# Patient Record
Sex: FEMALE | Employment: STUDENT | URBAN - METROPOLITAN AREA
[De-identification: names, ages, dates, MRNs, and addresses within clinical notes are randomized per-mention and may not be internally consistent; named-entity substitution may affect disease eponyms.]

---

## 2022-05-26 ENCOUNTER — HOSPITAL ENCOUNTER (OUTPATIENT)
Age: 22
Setting detail: SPECIMEN
Discharge: HOME | End: 2022-05-26
Payer: COMMERCIAL

## 2022-05-26 ENCOUNTER — OFFICE VISIT (OUTPATIENT)
Dept: OBGYN CLINIC | Age: 22
End: 2022-05-26
Payer: COMMERCIAL

## 2022-05-26 VITALS
HEART RATE: 86 BPM | DIASTOLIC BLOOD PRESSURE: 72 MMHG | HEIGHT: 70 IN | BODY MASS INDEX: 27.2 KG/M2 | WEIGHT: 190 LBS | SYSTOLIC BLOOD PRESSURE: 112 MMHG

## 2022-05-26 DIAGNOSIS — Z76.89 ENCOUNTER TO ESTABLISH CARE WITH NEW DOCTOR: ICD-10-CM

## 2022-05-26 DIAGNOSIS — N76.0 VAGINITIS AND VULVOVAGINITIS: Primary | ICD-10-CM

## 2022-05-26 DIAGNOSIS — N76.0 VAGINITIS AND VULVOVAGINITIS: ICD-10-CM

## 2022-05-26 PROCEDURE — 87491 CHLMYD TRACH DNA AMP PROBE: CPT

## 2022-05-26 PROCEDURE — 87591 N.GONORRHOEAE DNA AMP PROB: CPT

## 2022-05-26 PROCEDURE — 87808 TRICHOMONAS ASSAY W/OPTIC: CPT

## 2022-05-26 PROCEDURE — 99213 OFFICE O/P EST LOW 20 MIN: CPT | Performed by: OBSTETRICS & GYNECOLOGY

## 2022-05-26 PROCEDURE — 87210 SMEAR WET MOUNT SALINE/INK: CPT

## 2022-05-26 RX ORDER — NORETHINDRONE ACETATE AND ETHINYL ESTRADIOL AND FERROUS FUMARATE 1MG-20(21)
KIT ORAL
COMMUNITY
Start: 2022-04-23 | End: 2022-07-23

## 2022-05-26 NOTE — PROGRESS NOTES
Subjective:      Patient ID:  Azucena Nunez is a 23 y.o. female with chief complaint of:  Chief Complaint   Patient presents with    New Patient     new pt has swelling in her labial area after having sexual intercourse 2 days ago        Patient presents with concern she was sexually active a few days ago and noted a swelling in her left labia. She says this area seems to be going down or reducing in size but it still present and she presents today for evaluation      History reviewed. No pertinent past medical history. History reviewed. No pertinent surgical history. History reviewed. No pertinent family history. Current Outpatient Medications on File Prior to Visit   Medication Sig Dispense Refill    JUNEL FE 1/20 1-20 MG-MCG per tablet TAKE 1 TABLET BY MOUTH EVERY DAY       No current facility-administered medications on file prior to visit. Allergies:  Patient has no known allergies. Review of Systems   Constitutional: Negative for fatigue and fever. Respiratory: Negative for apnea and shortness of breath. Cardiovascular: Negative for chest pain and palpitations. Gastrointestinal: Negative for abdominal pain. Genitourinary: Negative for difficulty urinating, dyspareunia, dysuria, pelvic pain, vaginal bleeding and vaginal discharge. Neurological: Negative for dizziness, weakness and light-headedness. Psychiatric/Behavioral: Negative for agitation and dysphoric mood. Objective:   /72   Pulse 86   Ht 5' 10\" (1.778 m)   Wt 190 lb (86.2 kg)   LMP 05/16/2022   BMI 27.26 kg/m²      Physical Exam  Constitutional:       Appearance: She is well-developed. Eyes:      Pupils: Pupils are equal, round, and reactive to light. Cardiovascular:      Rate and Rhythm: Normal rate and regular rhythm. Heart sounds: Normal heart sounds. Pulmonary:      Effort: Pulmonary effort is normal.   Abdominal:      General: Bowel sounds are normal.      Palpations: Abdomen is soft. Genitourinary:     Labia:         Right: No rash, tenderness or lesion. Left: Tenderness present. No rash or lesion. Comments: Compressible fluid filled area inora the right labia minora. Non indurated  In nature  Neurological:      Mental Status: She is alert and oriented to person, place, and time. Assessment:       Diagnosis Orders   1. Vaginitis and vulvovaginitis  C.trachomatis N.gonorrhoeae DNA    Wet prep, genital   2. Encounter to establish care with new doctor  Marguerite Braga RN, MSN, CNP, Primary Care, [facility]         Plan:      Orders Placed This Encounter   Procedures    C.trachomatis N.gonorrhoeae DNA     Standing Status:   Future     Number of Occurrences:   1     Standing Expiration Date:   5/26/2023    Wet prep, genital     Standing Status:   Future     Number of Occurrences:   1     Standing Expiration Date:   5/26/2023   [address] [facility] Heidi Rivas RN, MSN, CNP, Primary Care, [facility]     Referral Priority:   Routine     Referral Type:   Eval and Treat     Referral Reason:   Specialty Services Required     Requested Specialty:   Nurse Practitioner     Number of Visits Requested:   1     No orders of the defined types were placed in this encounter. No follow-ups on file.      Juni Bartholomew DO

## 2022-05-27 LAB
CLUE CELLS: NORMAL
TRICHOMONAS PREP: NORMAL
TRICHOMONAS VAGINALIS SCREEN: NEGATIVE
YEAST WET PREP: NORMAL

## 2022-05-30 ASSESSMENT — ENCOUNTER SYMPTOMS
APNEA: 0
SHORTNESS OF BREATH: 0
ABDOMINAL PAIN: 0

## 2022-06-02 LAB
C TRACH DNA GENITAL QL NAA+PROBE: NEGATIVE
N. GONORRHOEAE DNA: NEGATIVE

## 2022-06-26 ENCOUNTER — HOSPITAL ENCOUNTER (EMERGENCY)
Age: 22
Discharge: HOME | End: 2022-06-26
Attending: EMERGENCY MEDICINE
Payer: COMMERCIAL

## 2022-06-26 VITALS
BODY MASS INDEX: 27.2 KG/M2 | RESPIRATION RATE: 18 BRPM | TEMPERATURE: 97.5 F | SYSTOLIC BLOOD PRESSURE: 139 MMHG | DIASTOLIC BLOOD PRESSURE: 89 MMHG | HEART RATE: 98 BPM | OXYGEN SATURATION: 98 % | WEIGHT: 190 LBS | HEIGHT: 70 IN

## 2022-06-26 DIAGNOSIS — N75.0 BARTHOLIN CYST: Primary | ICD-10-CM

## 2022-06-26 LAB
BACTERIA: ABNORMAL /HPF
BILIRUBIN URINE: NEGATIVE
BLOOD, URINE: ABNORMAL
CLARITY: CLEAR
COLOR: YELLOW
EPITHELIAL CELLS, UA: ABNORMAL /HPF
GLUCOSE URINE: NEGATIVE MG/DL
HCG(URINE) PREGNANCY TEST: NEGATIVE
KETONES, URINE: NEGATIVE MG/DL
LEUKOCYTE ESTERASE, URINE: NEGATIVE
NITRITE, URINE: NEGATIVE
PH UA: 7 (ref 5–9)
PROTEIN UA: NEGATIVE MG/DL
RBC UA: ABNORMAL /HPF (ref 0–2)
SPECIFIC GRAVITY UA: 1.02 (ref 1–1.03)
URINE REFLEX TO CULTURE: ABNORMAL
UROBILINOGEN, URINE: 0.2 E.U./DL
WBC UA: ABNORMAL /HPF (ref 0–5)

## 2022-06-26 PROCEDURE — 84703 CHORIONIC GONADOTROPIN ASSAY: CPT

## 2022-06-26 PROCEDURE — 99283 EMERGENCY DEPT VISIT LOW MDM: CPT

## 2022-06-26 PROCEDURE — 87491 CHLMYD TRACH DNA AMP PROBE: CPT

## 2022-06-26 PROCEDURE — 81001 URINALYSIS AUTO W/SCOPE: CPT

## 2022-06-26 PROCEDURE — 87591 N.GONORRHOEAE DNA AMP PROB: CPT

## 2022-06-26 RX ORDER — KETOROLAC TROMETHAMINE 10 MG/1
10 TABLET, FILM COATED ORAL EVERY 6 HOURS PRN
Qty: 20 TABLET | Refills: 0 | Status: SHIPPED | OUTPATIENT
Start: 2022-06-26 | End: 2022-07-18

## 2022-06-26 RX ORDER — DOXYCYCLINE HYCLATE 100 MG
100 TABLET ORAL 2 TIMES DAILY
Qty: 20 TABLET | Refills: 0 | Status: SHIPPED | OUTPATIENT
Start: 2022-06-26 | End: 2022-07-06

## 2022-06-26 ASSESSMENT — PAIN SCALES - GENERAL
PAINLEVEL_OUTOF10: 5
PAINLEVEL_OUTOF10: 5

## 2022-06-26 ASSESSMENT — PAIN DESCRIPTION - ONSET
ONSET: ON-GOING
ONSET: ON-GOING

## 2022-06-26 ASSESSMENT — PAIN DESCRIPTION - LOCATION
LOCATION: VAGINA
LOCATION: VAGINA

## 2022-06-26 ASSESSMENT — ENCOUNTER SYMPTOMS
ABDOMINAL PAIN: 0
CONSTIPATION: 0
WHEEZING: 0
VOMITING: 0
RHINORRHEA: 0
COUGH: 0
DIARRHEA: 0
SORE THROAT: 0
PHOTOPHOBIA: 0
ABDOMINAL DISTENTION: 0
APNEA: 0
COLOR CHANGE: 0
SHORTNESS OF BREATH: 0
NAUSEA: 0
BACK PAIN: 0
SINUS PRESSURE: 0
EYE PAIN: 0

## 2022-06-26 ASSESSMENT — PAIN DESCRIPTION - FREQUENCY
FREQUENCY: CONTINUOUS
FREQUENCY: CONTINUOUS

## 2022-06-26 ASSESSMENT — PAIN DESCRIPTION - DESCRIPTORS
DESCRIPTORS: BURNING
DESCRIPTORS: DISCOMFORT

## 2022-06-26 ASSESSMENT — PAIN DESCRIPTION - ORIENTATION: ORIENTATION: INNER

## 2022-06-26 ASSESSMENT — PAIN - FUNCTIONAL ASSESSMENT
PAIN_FUNCTIONAL_ASSESSMENT: 0-10
PAIN_FUNCTIONAL_ASSESSMENT: 0-10

## 2022-06-26 ASSESSMENT — PAIN DESCRIPTION - PAIN TYPE
TYPE: ACUTE PAIN
TYPE: ACUTE PAIN

## 2022-06-30 LAB
C. TRACHOMATIS DNA ,URINE: NEGATIVE
N. GONORRHOEAE DNA, URINE: NEGATIVE

## 2022-07-08 ENCOUNTER — OFFICE VISIT (OUTPATIENT)
Dept: OBGYN CLINIC | Age: 22
End: 2022-07-08
Payer: COMMERCIAL

## 2022-07-08 VITALS
BODY MASS INDEX: 27.63 KG/M2 | HEIGHT: 70 IN | SYSTOLIC BLOOD PRESSURE: 118 MMHG | WEIGHT: 193 LBS | DIASTOLIC BLOOD PRESSURE: 72 MMHG

## 2022-07-08 DIAGNOSIS — N90.7 LABIAL CYST: Primary | ICD-10-CM

## 2022-07-08 PROCEDURE — 99214 OFFICE O/P EST MOD 30 MIN: CPT | Performed by: OBSTETRICS & GYNECOLOGY

## 2022-07-08 ASSESSMENT — ENCOUNTER SYMPTOMS
APNEA: 0
ABDOMINAL PAIN: 0
SHORTNESS OF BREATH: 0

## 2022-07-09 NOTE — PROGRESS NOTES
Subjective:      Patient ID:  Neo Joy is a 23 y.o. female with chief complaint of:  Chief Complaint   Patient presents with    Follow-up     vaginal lump is getting larger and more bothersome       Patient presents today as follow-up she was originally seen because of a left labial cyst that was not discomforting but was enlarging. Etiology of the cyst is unknown. Patient desires to have this removed      No past medical history on file. No past surgical history on file. No family history on file. Current Outpatient Medications on File Prior to Visit   Medication Sig Dispense Refill    JUNEL FE 1/20 1-20 MG-MCG per tablet TAKE 1 TABLET BY MOUTH EVERY DAY      ketorolac (TORADOL) 10 MG tablet Take 1 tablet by mouth every 6 hours as needed for Pain (Patient not taking: Reported on 7/8/2022) 20 tablet 0     No current facility-administered medications on file prior to visit. Allergies:  Patient has no known allergies. Review of Systems   Constitutional: Negative for fatigue and fever. Respiratory: Negative for apnea and shortness of breath. Cardiovascular: Negative for chest pain and palpitations. Gastrointestinal: Negative for abdominal pain. Genitourinary: Positive for vaginal pain. Negative for difficulty urinating, dysuria, pelvic pain, vaginal bleeding and vaginal discharge. Neurological: Negative for dizziness, weakness and light-headedness. Psychiatric/Behavioral: Negative for agitation and dysphoric mood. Objective:   /72   Ht 5' 10\" (1.778 m)   Wt 193 lb (87.5 kg)   BMI 27.69 kg/m²      Physical Exam  Constitutional:       Appearance: She is well-developed. Eyes:      Pupils: Pupils are equal, round, and reactive to light. Cardiovascular:      Rate and Rhythm: Normal rate and regular rhythm. Heart sounds: Normal heart sounds.    Pulmonary:      Effort: Pulmonary effort is normal.   Abdominal:      General: Bowel sounds are normal.      Palpations: Abdomen is soft. Genitourinary:     Labia:         Right: No rash, tenderness or lesion. Left: Tenderness and lesion present. No rash. Neurological:      Mental Status: She is alert and oriented to person, place, and time. Psychiatric:         Mood and Affect: Mood normal.         Behavior: Behavior normal.         Assessment:       Diagnosis Orders   1. Labial cyst           Plan: We will surgically remove the labial cyst additionally with aspiration if no bleeding we will excise cyst  No orders of the defined types were placed in this encounter. No orders of the defined types were placed in this encounter. Return if symptoms worsen or fail to improve.      Kerrie Jacobo, DO

## 2022-07-12 ENCOUNTER — HOSPITAL ENCOUNTER (OUTPATIENT)
Dept: PREADMISSION TESTING | Age: 22
Discharge: HOME | End: 2022-07-16
Payer: COMMERCIAL

## 2022-07-12 VITALS
SYSTOLIC BLOOD PRESSURE: 135 MMHG | HEIGHT: 69 IN | BODY MASS INDEX: 27.96 KG/M2 | HEART RATE: 81 BPM | WEIGHT: 188.8 LBS | OXYGEN SATURATION: 98 % | TEMPERATURE: 97.5 F | RESPIRATION RATE: 16 BRPM | DIASTOLIC BLOOD PRESSURE: 62 MMHG

## 2022-07-12 DIAGNOSIS — N90.7 LABIAL CYST: ICD-10-CM

## 2022-07-12 LAB
HCT VFR BLD CALC: 37.2 % (ref 37–47)
HEMOGLOBIN: 12.1 G/DL (ref 12–16)
MCH RBC QN AUTO: 31.3 PG (ref 27–31.3)
MCHC RBC AUTO-ENTMCNC: 32.5 % (ref 33–37)
MCV RBC AUTO: 96.3 FL (ref 82–100)
PDW BLD-RTO: 12.9 % (ref 11.5–14.5)
PLATELET # BLD: 329 K/UL (ref 130–400)
RBC # BLD: 3.86 M/UL (ref 4.2–5.4)
WBC # BLD: 6.5 K/UL (ref 4.5–11)

## 2022-07-12 PROCEDURE — 85027 COMPLETE CBC AUTOMATED: CPT

## 2022-07-12 RX ORDER — SODIUM CHLORIDE, SODIUM LACTATE, POTASSIUM CHLORIDE, CALCIUM CHLORIDE 600; 310; 30; 20 MG/100ML; MG/100ML; MG/100ML; MG/100ML
INJECTION, SOLUTION INTRAVENOUS CONTINUOUS
Status: CANCELLED | OUTPATIENT
Start: 2022-07-18

## 2022-07-12 RX ORDER — SODIUM CHLORIDE 0.9 % (FLUSH) 0.9 %
5-40 SYRINGE (ML) INJECTION PRN
Status: CANCELLED | OUTPATIENT
Start: 2022-07-18

## 2022-07-12 RX ORDER — SODIUM CHLORIDE 9 MG/ML
INJECTION, SOLUTION INTRAVENOUS PRN
Status: CANCELLED | OUTPATIENT
Start: 2022-07-18

## 2022-07-12 RX ORDER — LIDOCAINE HYDROCHLORIDE 10 MG/ML
1 INJECTION, SOLUTION EPIDURAL; INFILTRATION; INTRACAUDAL; PERINEURAL
Status: CANCELLED | OUTPATIENT
Start: 2022-07-18 | End: 2022-07-18

## 2022-07-12 RX ORDER — NORETHINDRONE AND ETHINYL ESTRADIOL 0.5-0.035
KIT ORAL DAILY
Status: ON HOLD | COMMUNITY
Start: 2022-06-22 | End: 2022-07-18 | Stop reason: HOSPADM

## 2022-07-12 RX ORDER — SODIUM CHLORIDE 0.9 % (FLUSH) 0.9 %
5-40 SYRINGE (ML) INJECTION EVERY 12 HOURS SCHEDULED
Status: CANCELLED | OUTPATIENT
Start: 2022-07-18

## 2022-07-12 ASSESSMENT — ENCOUNTER SYMPTOMS
EYES NEGATIVE: 1
ALLERGIC/IMMUNOLOGIC NEGATIVE: 1
TROUBLE SWALLOWING: 0
SHORTNESS OF BREATH: 0
VOMITING: 0
COUGH: 0
CONSTIPATION: 0
SORE THROAT: 0
ABDOMINAL PAIN: 0
STRIDOR: 0
CHEST TIGHTNESS: 0
BACK PAIN: 1
DIARRHEA: 0
WHEEZING: 0
NAUSEA: 0

## 2022-07-12 NOTE — H&P
Nurse Practitioner History and Physical      CHIEF COMPLAINT:  Left labial cyst    HISTORY OF PRESENT ILLNESS:      The patient is a 23 y.o. female with significant past medical history of left labial cyst who presents with for EUA with drainage & incision of  labial cyst left. Onset left labial swelling after intercourse > 2 months ago. Size & discomfort remained the same -- ED visit due to discomfort & given round of antibiotic which did not change cyst.  Compares size to a walnut. Denies any drainage. Scheduled for OR. Past Medical History:    History reviewed. No pertinent past medical history. Past Surgical History:    History reviewed. No pertinent surgical history. Medications Prior to Admission:    Current Outpatient Medications   Medication Sig Dispense Refill    NORTREL 0.5/35, 28, 0.5-35 MG-MCG per tablet daily      ketorolac (TORADOL) 10 MG tablet Take 1 tablet by mouth every 6 hours as needed for Pain (Patient not taking: Reported on 7/8/2022) 20 tablet 0    JUNEL FE 1/20 1-20 MG-MCG per tablet TAKE 1 TABLET BY MOUTH EVERY DAY       No current facility-administered medications for this encounter. Allergies:  Patient has no known allergies.     Social History:   Social History     Socioeconomic History    Marital status: Single     Spouse name: Not on file    Number of children: Not on file    Years of education: Not on file    Highest education level: Not on file   Occupational History    Not on file   Tobacco Use    Smoking status: Never Smoker    Smokeless tobacco: Never Used   Vaping Use    Vaping Use: Never used   Substance and Sexual Activity    Alcohol use: Not Currently    Drug use: Never    Sexual activity: Yes     Partners: Male   Other Topics Concern    Not on file   Social History Narrative    Not on file     Social Determinants of Health     Financial Resource Strain:     Difficulty of Paying Living Expenses: Not on file   Food Insecurity:     Worried About Running Out of Food in the Last Year: Not on file    Ran Out of Food in the Last Year: Not on file   Transportation Needs:     Lack of Transportation (Medical): Not on file    Lack of Transportation (Non-Medical): Not on file   Physical Activity:     Days of Exercise per Week: Not on file    Minutes of Exercise per Session: Not on file   Stress:     Feeling of Stress : Not on file   Social Connections:     Frequency of Communication with Friends and Family: Not on file    Frequency of Social Gatherings with Friends and Family: Not on file    Attends Sabianism Services: Not on file    Active Member of [address] [place] [address] Statusly or Organizations: Not on file    Attends Club or Organization Meetings: Not on file    Marital Status: Not on file   Intimate Partner Violence:     Fear of Current or Ex-Partner: Not on file    Emotionally Abused: Not on file    Physically Abused: Not on file    Sexually Abused: Not on file   Housing Stability:     Unable to Pay for Housing in the Last Year: Not on file    Number of [place] in the Last Year: Not on file    Unstable Housing in the Last Year: Not on file       Family History:       Problem Relation Age of Onset    No Known Problems Father     No Known Problems Mother     Asthma Sister        Review of Systems   Constitutional: Negative. Negative for chills and fever. HENT: Negative for congestion, postnasal drip, sore throat, tinnitus and trouble swallowing. Eyes: Negative. Negative for visual disturbance. Respiratory: Negative for cough, chest tightness, shortness of breath, wheezing and stridor. Cardiovascular: Negative for chest pain and palpitations. Gastrointestinal: Negative for abdominal pain, constipation, diarrhea, nausea and vomiting. Endocrine: Negative. Genitourinary: Positive for vaginal pain. Negative for dysuria and frequency. Musculoskeletal: Positive for back pain (due to athletic / sport). Negative for myalgias and neck pain.    Skin: Negative. Allergic/Immunologic: Negative. Neurological: Negative. Negative for seizures and headaches. Hematological: Negative. Psychiatric/Behavioral: The patient is nervous/anxious. Vitals:  /62   Pulse 81   Temp 97.5 °F (36.4 °C) (Temporal)   Resp 16   Ht 5' 8.5\" (1.74 m)   Wt 188 lb 12.8 oz (85.6 kg)   LMP 07/11/2022   SpO2 98%   BMI 28.29 kg/m²     Physical Exam  Constitutional:       Appearance: She is well-developed. HENT:      Head: Normocephalic and atraumatic. Right Ear: Tympanic membrane, ear canal and external ear normal.      Left Ear: Tympanic membrane, ear canal and external ear normal.      Nose: No congestion. Mouth/Throat:      Mouth: Mucous membranes are moist.   Eyes:      General: No scleral icterus. Extraocular Movements: Extraocular movements intact. Conjunctiva/sclera: Conjunctivae normal.      Pupils: Pupils are equal, round, and reactive to light. Neck:      Thyroid: No thyromegaly. Vascular: No JVD. Trachea: No tracheal deviation. Cardiovascular:      Rate and Rhythm: Normal rate and regular rhythm. Heart sounds: Normal heart sounds. No murmur heard. No gallop. Pulmonary:      Effort: Pulmonary effort is normal. No respiratory distress. Breath sounds: Normal breath sounds. No wheezing or rales. Abdominal:      General: Bowel sounds are normal.      Palpations: Abdomen is soft. Tenderness: There is no abdominal tenderness. There is no guarding. Genitourinary:     Comments: Deferred. Musculoskeletal:         General: Normal range of motion. Cervical back: Normal range of motion. Right lower leg: No edema. Left lower leg: No edema. Lymphadenopathy:      Cervical: No cervical adenopathy. Skin:     General: Skin is warm and dry. Findings: No erythema. Neurological:      Mental Status: She is alert and oriented to person, place, and time.       Gait: Gait normal.   Psychiatric:

## 2022-07-17 NOTE — H&P
I attest that I have reviewed the H&P with the patient. All risks, benefits and alternative therapies were reviewed and the patient agrees to proceed with plan of care.    Yusuf Lubin, DO

## 2022-07-18 ENCOUNTER — HOSPITAL ENCOUNTER (OUTPATIENT)
Age: 22
Setting detail: OUTPATIENT SURGERY
Discharge: HOME | End: 2022-07-18
Attending: OBSTETRICS & GYNECOLOGY | Admitting: OBSTETRICS & GYNECOLOGY
Payer: COMMERCIAL

## 2022-07-18 ENCOUNTER — ANESTHESIA (OUTPATIENT)
Dept: OPERATING ROOM | Age: 22
End: 2022-07-18
Payer: COMMERCIAL

## 2022-07-18 ENCOUNTER — ANESTHESIA EVENT (OUTPATIENT)
Dept: OPERATING ROOM | Age: 22
End: 2022-07-18
Payer: COMMERCIAL

## 2022-07-18 VITALS
DIASTOLIC BLOOD PRESSURE: 77 MMHG | SYSTOLIC BLOOD PRESSURE: 125 MMHG | RESPIRATION RATE: 20 BRPM | HEART RATE: 78 BPM | TEMPERATURE: 98 F | OXYGEN SATURATION: 99 %

## 2022-07-18 LAB
HCG, URINE, POC: NEGATIVE
Lab: NORMAL
NEGATIVE QC PASS/FAIL: NORMAL
POSITIVE QC PASS/FAIL: NORMAL

## 2022-07-18 PROCEDURE — 3600000003 HC SURGERY LEVEL 3 BASE: Performed by: OBSTETRICS & GYNECOLOGY

## 2022-07-18 PROCEDURE — 2500000003 HC RX 250 WO HCPCS: Performed by: NURSE ANESTHETIST, CERTIFIED REGISTERED

## 2022-07-18 PROCEDURE — 7100000010 HC PHASE II RECOVERY - FIRST 15 MIN: Performed by: OBSTETRICS & GYNECOLOGY

## 2022-07-18 PROCEDURE — 3700000000 HC ANESTHESIA ATTENDED CARE: Performed by: OBSTETRICS & GYNECOLOGY

## 2022-07-18 PROCEDURE — 6360000002 HC RX W HCPCS: Performed by: NURSE ANESTHETIST, CERTIFIED REGISTERED

## 2022-07-18 PROCEDURE — 7100000001 HC PACU RECOVERY - ADDTL 15 MIN: Performed by: OBSTETRICS & GYNECOLOGY

## 2022-07-18 PROCEDURE — 7100000011 HC PHASE II RECOVERY - ADDTL 15 MIN: Performed by: OBSTETRICS & GYNECOLOGY

## 2022-07-18 PROCEDURE — 2709999900 HC NON-CHARGEABLE SUPPLY: Performed by: OBSTETRICS & GYNECOLOGY

## 2022-07-18 PROCEDURE — 10060 I&D ABSCESS SIMPLE/SINGLE: CPT | Performed by: OBSTETRICS & GYNECOLOGY

## 2022-07-18 PROCEDURE — 7100000000 HC PACU RECOVERY - FIRST 15 MIN: Performed by: OBSTETRICS & GYNECOLOGY

## 2022-07-18 PROCEDURE — 2580000003 HC RX 258: Performed by: OBSTETRICS & GYNECOLOGY

## 2022-07-18 PROCEDURE — 3600000013 HC SURGERY LEVEL 3 ADDTL 15MIN: Performed by: OBSTETRICS & GYNECOLOGY

## 2022-07-18 PROCEDURE — 3700000001 HC ADD 15 MINUTES (ANESTHESIA): Performed by: OBSTETRICS & GYNECOLOGY

## 2022-07-18 PROCEDURE — 2500000003 HC RX 250 WO HCPCS: Performed by: OBSTETRICS & GYNECOLOGY

## 2022-07-18 PROCEDURE — 6360000002 HC RX W HCPCS: Performed by: OBSTETRICS & GYNECOLOGY

## 2022-07-18 PROCEDURE — A4217 STERILE WATER/SALINE, 500 ML: HCPCS | Performed by: OBSTETRICS & GYNECOLOGY

## 2022-07-18 RX ORDER — MEPERIDINE HYDROCHLORIDE 25 MG/ML
12.5 INJECTION INTRAMUSCULAR; INTRAVENOUS; SUBCUTANEOUS EVERY 5 MIN PRN
Status: DISCONTINUED | OUTPATIENT
Start: 2022-07-18 | End: 2022-07-18 | Stop reason: HOSPADM

## 2022-07-18 RX ORDER — BUPIVACAINE HYDROCHLORIDE AND EPINEPHRINE 5; 5 MG/ML; UG/ML
INJECTION, SOLUTION EPIDURAL; INTRACAUDAL; PERINEURAL PRN
Status: DISCONTINUED | OUTPATIENT
Start: 2022-07-18 | End: 2022-07-18 | Stop reason: HOSPADM

## 2022-07-18 RX ORDER — FENTANYL CITRATE 50 UG/ML
25 INJECTION, SOLUTION INTRAMUSCULAR; INTRAVENOUS EVERY 5 MIN PRN
Status: DISCONTINUED | OUTPATIENT
Start: 2022-07-18 | End: 2022-07-18 | Stop reason: HOSPADM

## 2022-07-18 RX ORDER — SODIUM CHLORIDE 9 MG/ML
25 INJECTION, SOLUTION INTRAVENOUS PRN
Status: DISCONTINUED | OUTPATIENT
Start: 2022-07-18 | End: 2022-07-18 | Stop reason: HOSPADM

## 2022-07-18 RX ORDER — KETOROLAC TROMETHAMINE 30 MG/ML
INJECTION, SOLUTION INTRAMUSCULAR; INTRAVENOUS PRN
Status: DISCONTINUED | OUTPATIENT
Start: 2022-07-18 | End: 2022-07-18 | Stop reason: SDUPTHER

## 2022-07-18 RX ORDER — OXYCODONE HYDROCHLORIDE 5 MG/1
5 TABLET ORAL
Status: DISCONTINUED | OUTPATIENT
Start: 2022-07-18 | End: 2022-07-18 | Stop reason: HOSPADM

## 2022-07-18 RX ORDER — SODIUM CHLORIDE 0.9 % (FLUSH) 0.9 %
5-40 SYRINGE (ML) INJECTION PRN
Status: DISCONTINUED | OUTPATIENT
Start: 2022-07-18 | End: 2022-07-18 | Stop reason: HOSPADM

## 2022-07-18 RX ORDER — MIDAZOLAM HYDROCHLORIDE 1 MG/ML
INJECTION INTRAMUSCULAR; INTRAVENOUS PRN
Status: DISCONTINUED | OUTPATIENT
Start: 2022-07-18 | End: 2022-07-18 | Stop reason: SDUPTHER

## 2022-07-18 RX ORDER — SODIUM CHLORIDE, SODIUM LACTATE, POTASSIUM CHLORIDE, CALCIUM CHLORIDE 600; 310; 30; 20 MG/100ML; MG/100ML; MG/100ML; MG/100ML
INJECTION, SOLUTION INTRAVENOUS CONTINUOUS
Status: DISCONTINUED | OUTPATIENT
Start: 2022-07-18 | End: 2022-07-18 | Stop reason: HOSPADM

## 2022-07-18 RX ORDER — PROPOFOL 10 MG/ML
INJECTION, EMULSION INTRAVENOUS PRN
Status: DISCONTINUED | OUTPATIENT
Start: 2022-07-18 | End: 2022-07-18 | Stop reason: SDUPTHER

## 2022-07-18 RX ORDER — DEXAMETHASONE SODIUM PHOSPHATE 10 MG/ML
INJECTION INTRAMUSCULAR; INTRAVENOUS PRN
Status: DISCONTINUED | OUTPATIENT
Start: 2022-07-18 | End: 2022-07-18 | Stop reason: SDUPTHER

## 2022-07-18 RX ORDER — LABETALOL HYDROCHLORIDE 5 MG/ML
10 INJECTION, SOLUTION INTRAVENOUS
Status: DISCONTINUED | OUTPATIENT
Start: 2022-07-18 | End: 2022-07-18 | Stop reason: HOSPADM

## 2022-07-18 RX ORDER — IBUPROFEN 800 MG/1
800 TABLET ORAL 3 TIMES DAILY PRN
Qty: 90 TABLET | Refills: 0 | Status: SHIPPED | OUTPATIENT
Start: 2022-07-18

## 2022-07-18 RX ORDER — HYDRALAZINE HYDROCHLORIDE 20 MG/ML
10 INJECTION INTRAMUSCULAR; INTRAVENOUS
Status: DISCONTINUED | OUTPATIENT
Start: 2022-07-18 | End: 2022-07-18 | Stop reason: HOSPADM

## 2022-07-18 RX ORDER — ONDANSETRON 2 MG/ML
INJECTION INTRAMUSCULAR; INTRAVENOUS PRN
Status: DISCONTINUED | OUTPATIENT
Start: 2022-07-18 | End: 2022-07-18 | Stop reason: SDUPTHER

## 2022-07-18 RX ORDER — METOCLOPRAMIDE HYDROCHLORIDE 5 MG/ML
10 INJECTION INTRAMUSCULAR; INTRAVENOUS
Status: DISCONTINUED | OUTPATIENT
Start: 2022-07-18 | End: 2022-07-18 | Stop reason: HOSPADM

## 2022-07-18 RX ORDER — LIDOCAINE HYDROCHLORIDE 10 MG/ML
INJECTION, SOLUTION EPIDURAL; INFILTRATION; INTRACAUDAL; PERINEURAL PRN
Status: DISCONTINUED | OUTPATIENT
Start: 2022-07-18 | End: 2022-07-18 | Stop reason: SDUPTHER

## 2022-07-18 RX ORDER — FENTANYL CITRATE 50 UG/ML
INJECTION, SOLUTION INTRAMUSCULAR; INTRAVENOUS PRN
Status: DISCONTINUED | OUTPATIENT
Start: 2022-07-18 | End: 2022-07-18 | Stop reason: SDUPTHER

## 2022-07-18 RX ORDER — MAGNESIUM HYDROXIDE 1200 MG/15ML
LIQUID ORAL CONTINUOUS PRN
Status: DISCONTINUED | OUTPATIENT
Start: 2022-07-18 | End: 2022-07-18 | Stop reason: HOSPADM

## 2022-07-18 RX ORDER — ONDANSETRON 2 MG/ML
4 INJECTION INTRAMUSCULAR; INTRAVENOUS
Status: DISCONTINUED | OUTPATIENT
Start: 2022-07-18 | End: 2022-07-18 | Stop reason: HOSPADM

## 2022-07-18 RX ORDER — SODIUM CHLORIDE 0.9 % (FLUSH) 0.9 %
5-40 SYRINGE (ML) INJECTION EVERY 12 HOURS SCHEDULED
Status: DISCONTINUED | OUTPATIENT
Start: 2022-07-18 | End: 2022-07-18 | Stop reason: HOSPADM

## 2022-07-18 RX ADMIN — ONDANSETRON 4 MG: 2 INJECTION INTRAMUSCULAR; INTRAVENOUS at 10:56

## 2022-07-18 RX ADMIN — SODIUM CHLORIDE, POTASSIUM CHLORIDE, SODIUM LACTATE AND CALCIUM CHLORIDE 100 ML/HR: 600; 310; 30; 20 INJECTION, SOLUTION INTRAVENOUS at 10:00

## 2022-07-18 RX ADMIN — KETOROLAC TROMETHAMINE 30 MG: 30 INJECTION, SOLUTION INTRAMUSCULAR; INTRAVENOUS at 11:09

## 2022-07-18 RX ADMIN — DEXAMETHASONE SODIUM PHOSPHATE 10 MG: 10 INJECTION INTRAMUSCULAR; INTRAVENOUS at 10:56

## 2022-07-18 RX ADMIN — LIDOCAINE HYDROCHLORIDE 50 MG: 10 INJECTION, SOLUTION EPIDURAL; INFILTRATION; INTRACAUDAL; PERINEURAL at 10:48

## 2022-07-18 RX ADMIN — PROPOFOL 200 MG: 10 INJECTION, EMULSION INTRAVENOUS at 10:48

## 2022-07-18 RX ADMIN — MIDAZOLAM HYDROCHLORIDE 2 MG: 1 INJECTION, SOLUTION INTRAMUSCULAR; INTRAVENOUS at 10:42

## 2022-07-18 RX ADMIN — FENTANYL CITRATE 100 MCG: 50 INJECTION, SOLUTION INTRAMUSCULAR; INTRAVENOUS at 10:48

## 2022-07-18 RX ADMIN — CEFAZOLIN 2 G: 10 INJECTION, POWDER, FOR SOLUTION INTRAVENOUS at 10:52

## 2022-07-18 ASSESSMENT — PAIN - FUNCTIONAL ASSESSMENT: PAIN_FUNCTIONAL_ASSESSMENT: NONE - DENIES PAIN

## 2022-07-18 NOTE — ANESTHESIA POSTPROCEDURE EVALUATION
Department of Anesthesiology  Postprocedure Note    Patient: Tanya Watkins  MRN: [id number]  YOB: 2000  Date of evaluation: 7/18/2022      Procedure Summary     Date: 07/18/22 Room / Location: [address]    Anesthesia Start: 7476 Anesthesia Stop:     Procedure: EVALUATION UNDER ANESTHESIA WITH DRAINAGE AND EXCISION OF LABIAL CYST LEFT. (Left) Diagnosis:       Labial cyst      (LEFT LABIAL CYST)    Surgeons: Laurie Ya DO Responsible Provider: Erica Guzman MD    Anesthesia Type: general ASA Status: 1          Anesthesia Type: No value filed.     Sunita Phase I: Sunita Score: 8    Sunita Phase II:        Anesthesia Post Evaluation    Patient location during evaluation: PACU  Patient participation: waiting for patient participation  Level of consciousness: sleepy but conscious  Pain score: 1  Airway patency: patent  Nausea & Vomiting: no nausea and no vomiting  Complications: no  Cardiovascular status: hemodynamically stable  Respiratory status: acceptable and face mask  Hydration status: euvolemic  Comments: Report to RN, normal sinus rhythm

## 2022-07-18 NOTE — OP NOTE
OPERATIVE NOTE: Labial CYST EXCISION     23 y.o. female with a history of enlarging left labial cyst. Pt noted to have a large labial cyst cyst on exam and patient desires excision of Bartholin's cyst. Risks, benefits and alternative therapies for treatment discussed. Pt agrees to surgical treatment of labial cyst want to be prepared for any vascular component      Pre-op Dx: left labial mass   Post-op Dx: left labial cyst   Procedure: left labial minora cyst excision and drainage and marsupialization   Surgeon: Dr. Mita Golden   Anesthesia:local lidocaine w/o epi  EBL: minimal       Pt was prepped and draped in the normal sterile fashion in a dorsal lithotomy position. Time out was taken to ensure proper patient, placement and procedure. The labial mass was located on the patient's left side and marcaine anesthesia was used with aspiration I concluded it was not a vascular mass. A 2cm incision was made through the cyst wall and the copious thick mucoid straw colored fluid was expressed until the cyst was completely collapsed. The cyst was was grasped with hemostats and it was irrigated. The cyst wall was closed mucosa to and the skin edge in a interrupted  fashion to creat an open pocket. The cavity was explored and noted to be drained completely. Sponge and needle counts were correct x 2. Pt tolerated the procedure well and pt was take to recovery in stable condition.

## 2022-07-18 NOTE — ANESTHESIA PRE PROCEDURE
Department of Anesthesiology  Preprocedure Note       Name:  Mark Arreola   Age:  23 y.o.  :  2000                                          MRN:  [id number]         Date:  2022      Surgeon: Keith Johnson):  Marline Rodrigues DO    Procedure: Procedure(s):  EVALUATION UNDER ANESTHESIA WITH DRAINAGE AND EXCISION OF LABIAL CYST LEFT. Medications prior to admission:   Prior to Admission medications    Medication Sig Start Date End Date Taking? Authorizing Provider   Raman Mis 0.5/35, 28, 0.5-35 MG-MCG per tablet daily 22   Historical Provider, MD   ketorolac (TORADOL) 10 MG tablet Take 1 tablet by mouth every 6 hours as needed for Pain  Patient not taking: No sig reported 22   Aman Mckay MD   [facility]  1-20 MG-MCG per tablet TAKE 1 TABLET BY MOUTH EVERY DAY 22   Historical Provider, MD       Current medications:    Current Facility-Administered Medications   Medication Dose Route Frequency Provider Last Rate Last Admin    lactated ringers infusion   IntraVENous Continuous Devan Gaines DO           Allergies:  No Known Allergies    Problem List:    Patient Active Problem List   Diagnosis Code     labial cyst left N90.7       Past Medical History:  No past medical history on file. Past Surgical History:  No past surgical history on file.     Social History:    Social History     Tobacco Use    Smoking status: Never    Smokeless tobacco: Never   Substance Use Topics    Alcohol use: Not Currently                                Counseling given: Not Answered      Vital Signs (Current):   Vitals:    22 0913   BP: 128/68   Pulse: 71   Resp: 16   Temp: 98 °F (36.7 °C)                                              BP Readings from Last 3 Encounters:   22 128/68   22 135/62   22 118/72       NPO Status: Time of last liquid consumption:                         Time of last solid consumption:                         Date of last liquid consumption: 07/17/22                        Date of last solid food consumption: 07/17/22    BMI:   Wt Readings from Last 3 Encounters:   07/12/22 188 lb 12.8 oz (85.6 kg)   07/08/22 193 lb (87.5 kg)   06/26/22 190 lb (86.2 kg)     There is no height or weight on file to calculate BMI.    CBC:   Lab Results   Component Value Date/Time    WBC 6.5 07/12/2022 04:40 PM    RBC 3.86 07/12/2022 04:40 PM    HGB 12.1 07/12/2022 04:40 PM    HCT 37.2 07/12/2022 04:40 PM    MCV 96.3 07/12/2022 04:40 PM    RDW 12.9 07/12/2022 04:40 PM     07/12/2022 04:40 PM       CMP: No results found for: NA, K, CL, CO2, BUN, CREATININE, GFRAA, AGRATIO, LABGLOM, GLUCOSE, GLU, PROT, CALCIUM, BILITOT, ALKPHOS, AST, ALT    POC Tests: No results for input(s): POCGLU, POCNA, POCK, POCCL, POCBUN, POCHEMO, POCHCT in the last 72 hours. Coags: No results found for: PROTIME, INR, APTT    HCG (If Applicable):   Lab Results   Component Value Date    PREGTESTUR Negative 06/26/2022        ABGs: No results found for: PHART, PO2ART, UIX0SLE, ATK1MGE, BEART, G0XIOQUR     Type & Screen (If Applicable):  No results found for: LABABO, LABRH    Drug/Infectious Status (If Applicable):  No results found for: HIV, HEPCAB    COVID-19 Screening (If Applicable): No results found for: COVID19        Anesthesia Evaluation  Patient summary reviewed and Nursing notes reviewed no history of anesthetic complications:   Airway: Mallampati: II  TM distance: >3 FB   Neck ROM: full  Mouth opening: > = 3 FB   Dental: normal exam         Pulmonary:Negative Pulmonary ROS and normal exam                               Cardiovascular:Negative CV ROS  Exercise tolerance: good (>4 METS),            Beta Blocker:  Not on Beta Blocker         Neuro/Psych:   Negative Neuro/Psych ROS              GI/Hepatic/Renal: Neg GI/Hepatic/Renal ROS            Endo/Other: Negative Endo/Other ROS             Pt had PAT visit. Abdominal:             Vascular: negative vascular ROS.          Other Findings:           Anesthesia Plan      general     ASA 1       Induction: intravenous. MIPS: Postoperative opioids intended and Prophylactic antiemetics administered. Anesthetic plan and risks discussed with patient. Plan discussed with CRNA.     Attending anesthesiologist reviewed and agrees with Pre Eval content                Leeanne Sweeney MD   7/18/2022

## 2022-07-23 ENCOUNTER — HOSPITAL ENCOUNTER (EMERGENCY)
Age: 22
Discharge: HOME | End: 2022-07-23
Attending: EMERGENCY MEDICINE
Payer: COMMERCIAL

## 2022-07-23 VITALS
OXYGEN SATURATION: 97 % | WEIGHT: 190 LBS | TEMPERATURE: 98.6 F | HEIGHT: 70 IN | RESPIRATION RATE: 20 BRPM | HEART RATE: 76 BPM | DIASTOLIC BLOOD PRESSURE: 79 MMHG | SYSTOLIC BLOOD PRESSURE: 129 MMHG | BODY MASS INDEX: 27.2 KG/M2

## 2022-07-23 DIAGNOSIS — T81.40XA POSTOPERATIVE INFECTION, INITIAL ENCOUNTER: Primary | ICD-10-CM

## 2022-07-23 PROCEDURE — 99283 EMERGENCY DEPT VISIT LOW MDM: CPT

## 2022-07-23 PROCEDURE — 6370000000 HC RX 637 (ALT 250 FOR IP): Performed by: EMERGENCY MEDICINE

## 2022-07-23 RX ORDER — NORETHINDRONE AND ETHINYL ESTRADIOL 0.5-0.035
1 KIT ORAL DAILY
COMMUNITY

## 2022-07-23 RX ORDER — METRONIDAZOLE 500 MG/1
500 TABLET ORAL 3 TIMES DAILY
Qty: 30 TABLET | Refills: 0 | Status: SHIPPED | OUTPATIENT
Start: 2022-07-23 | End: 2022-08-02

## 2022-07-23 RX ORDER — OXYCODONE HYDROCHLORIDE AND ACETAMINOPHEN 5; 325 MG/1; MG/1
1 TABLET ORAL ONCE
Status: COMPLETED | OUTPATIENT
Start: 2022-07-23 | End: 2022-07-23

## 2022-07-23 RX ORDER — METRONIDAZOLE 500 MG/1
500 TABLET ORAL ONCE
Status: COMPLETED | OUTPATIENT
Start: 2022-07-23 | End: 2022-07-23

## 2022-07-23 RX ORDER — TRAMADOL HYDROCHLORIDE 50 MG/1
50 TABLET ORAL EVERY 6 HOURS PRN
Qty: 20 TABLET | Refills: 0 | Status: SHIPPED | OUTPATIENT
Start: 2022-07-23 | End: 2022-07-26

## 2022-07-23 RX ADMIN — METRONIDAZOLE 500 MG: 500 TABLET ORAL at 12:19

## 2022-07-23 RX ADMIN — OXYCODONE AND ACETAMINOPHEN 1 TABLET: 325; 5 TABLET ORAL at 12:19

## 2022-07-23 ASSESSMENT — ENCOUNTER SYMPTOMS
SHORTNESS OF BREATH: 0
SINUS PRESSURE: 0
BACK PAIN: 0
CONSTIPATION: 0
NAUSEA: 0
PHOTOPHOBIA: 0
EYE PAIN: 0
RHINORRHEA: 0
COLOR CHANGE: 0
WHEEZING: 0
APNEA: 0
COUGH: 0
SORE THROAT: 0
DIARRHEA: 0
ABDOMINAL PAIN: 0
ABDOMINAL DISTENTION: 0
VOMITING: 0

## 2022-07-23 ASSESSMENT — PAIN SCALES - GENERAL
PAINLEVEL_OUTOF10: 10
PAINLEVEL_OUTOF10: 10

## 2022-07-23 ASSESSMENT — PAIN DESCRIPTION - DESCRIPTORS
DESCRIPTORS: BURNING
DESCRIPTORS: BURNING

## 2022-07-23 ASSESSMENT — PAIN DESCRIPTION - FREQUENCY
FREQUENCY: INTERMITTENT
FREQUENCY: INTERMITTENT

## 2022-07-23 ASSESSMENT — PAIN DESCRIPTION - LOCATION
LOCATION: GROIN
LOCATION: GROIN

## 2022-07-23 ASSESSMENT — PAIN - FUNCTIONAL ASSESSMENT: PAIN_FUNCTIONAL_ASSESSMENT: 0-10

## 2022-07-23 ASSESSMENT — PAIN DESCRIPTION - PAIN TYPE
TYPE: ACUTE PAIN;SURGICAL PAIN
TYPE: ACUTE PAIN;SURGICAL PAIN

## 2022-07-23 NOTE — ED NOTES
Explained discharge instructions and prescriptions to patient. Went over discharge diagnosis and pertinent educational material with patient. Patient stated understanding of discharge diagnosis, instructions, and prescriptions. Patient denies any questions at this time, all concerns addressed. No signs or symptoms of pain noted at this time. A/0 x3, ambulatory, resps even and unlabored on room air. Follow up instructions and reasons to return to ER reviewed. Patient denies needs at time of discharge.         Fay Briceno RN  07/23/22 3452

## 2022-07-23 NOTE — ED PROVIDER NOTES
[address] [place] [address] ED  eMERGENCY dEPARTMENT eNCOUnter      Pt Name: Renetta Jarvis  MRN: [id number]  [place] 2000  Date of evaluation: 7/23/2022  Provider: Julian Phipps MD    CHIEF COMPLAINT       Chief Complaint   Patient presents with    Post-op Problem     Pt having pain and discharge post cyst removal         HISTORY OF PRESENT ILLNESS   (Location/Symptom, Timing/Onset,Context/Setting, Quality, Duration, Modifying Factors, Severity)  Note limiting factors. Renetta Jarvis is a 23 y.o. female who presents to the emergency department with complaint of vaginal discharge status post left labial cyst excision 7 days ago. Claims pain of 10 in a scale of 1-10 that is sharp. Discharge has ordered to it. She is not on any antibiotic. Anais Duarte No other systemic symptoms. HPI    Nursing Notes were reviewed. REVIEW OF SYSTEMS    (2-9 systems for level 4, 10 or more for level 5)     Review of Systems   Constitutional: Negative. Negative for activity change, appetite change, chills, fatigue and fever. HENT:  Negative for congestion, ear discharge, ear pain, hearing loss, rhinorrhea, sinus pressure and sore throat. Eyes:  Negative for photophobia, pain and visual disturbance. Respiratory:  Negative for apnea, cough, shortness of breath and wheezing. Cardiovascular:  Negative for chest pain, palpitations and leg swelling. Gastrointestinal:  Negative for abdominal distention, abdominal pain, constipation, diarrhea, nausea and vomiting. Endocrine: Negative for cold intolerance, heat intolerance and polyuria. Genitourinary:  Positive for vaginal discharge. Negative for dysuria, flank pain, frequency and urgency. Musculoskeletal:  Negative for arthralgias, back pain, gait problem, myalgias and neck stiffness. Skin:  Negative for color change, pallor and rash. Allergic/Immunologic: Negative for food allergies and immunocompromised state.    Neurological:  Negative for dizziness, tremors, syncope, weakness, light-headedness and headaches. Psychiatric/Behavioral:  Negative for agitation, confusion and hallucinations. All other systems reviewed and are negative. Except as noted above the remainder of the review of systems was reviewed and negative. PAST MEDICAL HISTORY   History reviewed. No pertinent past medical history. SURGICAL HISTORY       Past Surgical History:   Procedure Laterality Date    VULVA SURGERY Left 7/18/2022    EVALUATION UNDER ANESTHESIA WITH DRAINAGE AND EXCISION OF LABIAL CYST LEFT. performed by Addy Waytt DO at [address] [place] [address]       Discharge Medication List as of 7/23/2022 12:19 PM        CONTINUE these medications which have NOT CHANGED    Details   norethindrone-ethinyl estradiol (Conner Roam 0.5/35, 28,) 0.5-35 MG-MCG per tablet Take 1 tablet by mouth dailyHistorical Med      ibuprofen (ADVIL;MOTRIN) 800 MG tablet Take 1 tablet by mouth 3 times daily as needed for Pain, Disp-90 tablet, R-0Normal             ALLERGIES     Patient has no known allergies. FAMILY HISTORY       Family History   Problem Relation Age of Onset    No Known Problems Father     No Known Problems Mother     Asthma Sister           SOCIAL HISTORY       Social History     Socioeconomic History    Marital status: Single     Spouse name: None    Number of children: None    Years of education: None    Highest education level: None   Tobacco Use    Smoking status: Never    Smokeless tobacco: Never   Vaping Use    Vaping Use: Never used   Substance and Sexual Activity    Alcohol use: Not Currently    Drug use: Never    Sexual activity: Yes     Partners: Male       SCREENINGS             PHYSICAL EXAM    (up to 7 for level 4, 8 or more for level 5)     ED Triage Vitals [07/23/22 1130]   BP Temp Temp Source Heart Rate Resp SpO2 Height Weight   129/79 98.6 °F (37 °C) Oral 76 20 97 % 5' 10\" (1.778 m) 190 lb (86.2 kg)       Physical Exam  Vitals and nursing note reviewed. Constitutional:       General: She is not in acute distress. Appearance: Normal appearance. She is well-developed and normal weight. She is not ill-appearing, toxic-appearing or diaphoretic. HENT:      Head: Normocephalic and atraumatic. Nose: Nose normal. No congestion or rhinorrhea. Mouth/Throat:      Mouth: Mucous membranes are moist.      Pharynx: Oropharynx is clear. No oropharyngeal exudate or posterior oropharyngeal erythema. Eyes:      General: No scleral icterus. Right eye: No discharge. Left eye: No discharge. Extraocular Movements: Extraocular movements intact. Conjunctiva/sclera: Conjunctivae normal.      Pupils: Pupils are equal, round, and reactive to light. Neck:      Thyroid: No thyromegaly. Vascular: No carotid bruit or JVD. Trachea: No tracheal deviation. Cardiovascular:      Rate and Rhythm: Normal rate and regular rhythm. Pulses: Normal pulses. Heart sounds: Normal heart sounds. No murmur heard. No friction rub. No gallop. Pulmonary:      Effort: Pulmonary effort is normal. No respiratory distress. Breath sounds: Normal breath sounds. No stridor. No wheezing, rhonchi or rales. Chest:      Chest wall: No tenderness. Abdominal:      General: Abdomen is flat. Bowel sounds are normal. There is no distension. Palpations: Abdomen is soft. There is no mass. Tenderness: There is no abdominal tenderness. There is no right CVA tenderness, left CVA tenderness, guarding or rebound. Hernia: No hernia is present. Genitourinary:     General: Normal vulva. Vagina: Vaginal discharge present. Comments: Foul-smelling postop vaginal discharge  Musculoskeletal:         General: No swelling, tenderness, deformity or signs of injury. Normal range of motion. Cervical back: Normal range of motion and neck supple. No rigidity or tenderness. Right lower leg: No edema. Left lower leg: No edema. Lymphadenopathy:      Cervical: No cervical adenopathy. Skin:     General: Skin is warm and dry. Capillary Refill: Capillary refill takes less than 2 seconds. Coloration: Skin is not jaundiced or pale. Findings: No bruising, erythema, lesion or rash. Neurological:      General: No focal deficit present. Mental Status: She is alert and oriented to person, place, and time. Mental status is at baseline. Cranial Nerves: No cranial nerve deficit. Sensory: No sensory deficit. Motor: No weakness or abnormal muscle tone. Coordination: Coordination normal.      Gait: Gait normal.      Deep Tendon Reflexes: Reflexes are normal and symmetric. Reflexes normal.   Psychiatric:         Mood and Affect: Mood normal.         Behavior: Behavior normal.         Thought Content: Thought content normal.         Judgment: Judgment normal.       DIAGNOSTIC RESULTS     EKG: All EKG's are interpreted by the Emergency Department Physician who either signs or Co-signs this chart in the absence of a cardiologist.        RADIOLOGY:   Non-plain film images such as CT, Ultrasound and MRI are read by the radiologist. Anderson Gisele radiographicimages are visualized and preliminarily interpreted by the emergency physician with the below findings:        Interpretation per the Radiologist below, if available at the time of this note:    No orders to display         ED BEDSIDE ULTRASOUND:   Performed by ED Physician - none    LABS:  Labs Reviewed - No data to display    All other labs were within normal range or not returned as of this dictation.     EMERGENCY DEPARTMENT COURSE and DIFFERENTIALDIAGNOSIS/MDM:   Vitals:    Vitals:    07/23/22 1130   BP: 129/79   Pulse: 76   Resp: 20   Temp: 98.6 °F (37 °C)   TempSrc: Oral   SpO2: 97%   Weight: 190 lb (86.2 kg)   Height: 5' 10\" (1.778 m)           MDM  Risk of Complications, Morbidity, and/or Mortality  Presenting problems: moderate  Diagnostic procedures: moderate  Management options: moderate    Patient Progress  Patient progress: improved      CRITICAL CARE TIME   Total Critical Care time was  minutes, excluding separately reportable procedures. There was a high probability of clinically significant/life threatening deterioration in the patient's condition which required my urgentintervention. CONSULTS:  None    PROCEDURES:  Unless otherwise noted below, none     Procedures    FINAL IMPRESSION      1. Postoperative infection, initial encounter          DISPOSITION/PLAN   DISPOSITION Decision To Discharge 07/23/2022 12:02:09 PM      PATIENT REFERRED TO:  Ramirez Heaton MD  [address] [place] [address] 100  [address] [facility] [phone]    In 3 days      Follow-up with your gynecologist as already scheduled. DISCHARGE MEDICATIONS:  Discharge Medication List as of 7/23/2022 12:19 PM        START taking these medications    Details   metroNIDAZOLE (FLAGYL) 500 MG tablet Take 1 tablet by mouth in the morning and 1 tablet at noon and 1 tablet before bedtime. Do all this for 10 days. , Disp-30 tablet, R-0Normal      traMADol (ULTRAM) 50 MG tablet Take 1 tablet by mouth every 6 hours as needed for Pain for up to 3 days. , Disp-20 tablet, R-0Print                (Please note that portions of this note were completed with a voice recognitionprogram.  Efforts were made to edit the dictations but occasionally words are mis-transcribed.)    Eldon Hartman MD (electronically signed)  Attending Emergency Physician          Eldon Hartman MD  07/23/22 [id number]

## 2022-07-23 NOTE — ED TRIAGE NOTES
Pt arrives to ED, from home, via personal vehicle. Pt presents with post op drainage from a cyst that was drained several days ago, by Wilner. Pt was not placed on antibiotics after procedure.

## 2022-07-26 ENCOUNTER — TELEPHONE (OUTPATIENT)
Dept: OBGYN CLINIC | Age: 22
End: 2022-07-26

## 2022-07-26 ENCOUNTER — TELEMEDICINE (OUTPATIENT)
Dept: OBGYN CLINIC | Age: 22
End: 2022-07-26

## 2022-07-26 DIAGNOSIS — Z09 POSTOPERATIVE FOLLOW-UP: Primary | ICD-10-CM

## 2022-07-26 PROCEDURE — 99024 POSTOP FOLLOW-UP VISIT: CPT | Performed by: OBSTETRICS & GYNECOLOGY

## 2022-07-26 NOTE — PROGRESS NOTES
2022    In vehicle/doc in office    TELEHEALTH EVALUATION -- Audio/Visual (During [facility] public health emergency)    HPI:    Rich Figueroa (:  2000) has requested an audio/video evaluation for the following concern(s):    Patient presents to discuss recovery from labial cyst excision and healing. No abnormal bleeding pain has improved. Patient desires to return to exercise which I will allo but she mustn't have sex yet    Review of Systems    Prior to Visit Medications    Medication Sig Taking? Authorizing Provider   norethindrone-ethinyl estradiol (NECON 0.5/35, 28,) 0.5-35 MG-MCG per tablet Take 1 tablet by mouth daily  Historical Provider, MD   metroNIDAZOLE (FLAGYL) 500 MG tablet Take 1 tablet by mouth in the morning and 1 tablet at noon and 1 tablet before bedtime. Do all this for 10 days. Sade Sahu MD   traMADol (ULTRAM) 50 MG tablet Take 1 tablet by mouth every 6 hours as needed for Pain for up to 3 days. Sade Sahu MD   ibuprofen (ADVIL;MOTRIN) 800 MG tablet Take 1 tablet by mouth 3 times daily as needed for Pain  Tani Escalera DO   ketorolac (TORADOL) 10 MG tablet Take 1 tablet by mouth every 6 hours as needed for Pain  Patient not taking: No sig reported  Sade Sahu MD       Social History     Tobacco Use    Smoking status: Never    Smokeless tobacco: Never   Vaping Use    Vaping Use: Never used   Substance Use Topics    Alcohol use: Not Currently    Drug use: Never        No Known Allergies, No past medical history on file.,   Past Surgical History:   Procedure Laterality Date    VULVA SURGERY Left 2022    EVALUATION UNDER ANESTHESIA WITH DRAINAGE AND EXCISION OF LABIAL CYST LEFT.  performed by Tani Escalera DO at [facility]   ,   Social History     Tobacco Use    Smoking status: Never    Smokeless tobacco: Never   Vaping Use    Vaping Use: Never used   Substance Use Topics    Alcohol use: Not Currently    Drug use: Never       PHYSICAL EXAMINATION:  [ INSTRUCTIONS:  \"[x]\" Indicates a positive item  \"[]\" Indicates a negative item  -- DELETE ALL ITEMS NOT EXAMINED]  Vital Signs: (As obtained by patient/caregiver or practitioner observation)    Blood pressure-  Heart rate-    Respiratory rate-    Temperature-  Pulse oximetry-     Constitutional: [x] Appears well-developed and well-nourished [x] No apparent distress      [] Abnormal-   Mental status  [x] Alert and awake  [x] Oriented to person/place/time []Able to follow commands      Eyes:  EOM    []  Normal  [] Abnormal-  Sclera  []  Normal  [] Abnormal -         Discharge []  None visible  [] Abnormal -    HENT:   [x] Normocephalic, atraumatic. [] Abnormal   [] Mouth/Throat: Mucous membranes are moist.     External Ears [] Normal  [] Abnormal-     Neck: [] No visualized mass     Pulmonary/Chest: [x] Respiratory effort normal.  [x] No visualized signs of difficulty breathing or respiratory distress        [] Abnormal-      Musculoskeletal:   [] Normal gait with no signs of ataxia         [] Normal range of motion of neck        [] Abnormal-       Neurological:        [] No Facial Asymmetry (Cranial nerve 7 motor function) (limited exam to video visit)          [] No gaze palsy        [] Abnormal-         Skin:        [] No significant exanthematous lesions or discoloration noted on facial skin         [] Abnormal-            Psychiatric:       [x] Normal Affect [] No Hallucinations        [] Abnormal-     Other pertinent observable physical exam findings-     ASSESSMENT/PLAN:    1. Postoperative follow-up    Recovering well    Return in about 2 weeks (around 8/9/2022) for postop. Lonzell Claude, was evaluated through a synchronous (real-time) audio-video encounter. The patient (or guardian if applicable) is aware that this is a billable service, which includes applicable co-pays. This Virtual Visit was conducted with patient's (and/or legal guardian's) consent.  The visit was conducted pursuant to the emergency declaration under the [facility], [facility] authority and the [employer] and [employer] General Act. Patient identification was verified, and a caregiver was present when appropriate. The patient was located at Other: car . Provider was located at Other: office . Total time spent on this encounter: Not billed by time    --Kitty Wolf, DO on 7/26/2022 at 9:52 PM    An electronic signature was used to authenticate this note.

## 2022-07-26 NOTE — TELEPHONE ENCOUNTER
Patient states that she has some questions about post-op after care , she states that when being discharged she didn't receive enough information for all the questions she has and she would like to talk the over with Dr. Portillo Doing

## 2022-08-03 ENCOUNTER — OFFICE VISIT (OUTPATIENT)
Dept: OBGYN CLINIC | Age: 22
End: 2022-08-03
Payer: COMMERCIAL

## 2022-08-03 VITALS
DIASTOLIC BLOOD PRESSURE: 62 MMHG | SYSTOLIC BLOOD PRESSURE: 110 MMHG | HEIGHT: 70 IN | BODY MASS INDEX: 28.06 KG/M2 | WEIGHT: 196 LBS

## 2022-08-03 DIAGNOSIS — Z09 POSTOP CHECK: Primary | ICD-10-CM

## 2022-08-03 PROCEDURE — 99214 OFFICE O/P EST MOD 30 MIN: CPT | Performed by: OBSTETRICS & GYNECOLOGY

## 2022-08-03 NOTE — PROGRESS NOTES
Chaperone for Intimate Exam  Chaperone was offered as part of the rooming process. Patient declined and agrees to continue with exam without a chaperone.   Chaperone: declined

## 2022-08-04 NOTE — PROGRESS NOTES
Subjective:      Patient ID:  Laura Sahu is a 23 y.o. female with chief complaint of:  Chief Complaint   Patient presents with    Post-Op Check     Labial cyst        Patient presents as follow-up to recent surgical intervention for labial cyst.  Patient is currently without any complaints. Patient is back to normal activity without any difficulty or pain      No past medical history on file. Past Surgical History:   Procedure Laterality Date    VULVA SURGERY Left 7/18/2022    EVALUATION UNDER ANESTHESIA WITH DRAINAGE AND EXCISION OF LABIAL CYST LEFT. performed by Tamera Alexis DO at [address] History   Problem Relation Age of Onset    No Known Problems Father     No Known Problems Mother     Asthma Sister      Current Outpatient Medications on File Prior to Visit   Medication Sig Dispense Refill    norethindrone-ethinyl estradiol (Aidan Bristle 0.5/35, 28,) 0.5-35 MG-MCG per tablet Take 1 tablet by mouth daily      ibuprofen (ADVIL;MOTRIN) 800 MG tablet Take 1 tablet by mouth 3 times daily as needed for Pain 90 tablet 0    [DISCONTINUED] ketorolac (TORADOL) 10 MG tablet Take 1 tablet by mouth every 6 hours as needed for Pain (Patient not taking: No sig reported) 20 tablet 0     No current facility-administered medications on file prior to visit. Allergies:  Patient has no known allergies. Review of Systems   All other systems reviewed and are negative. Objective:   /62   Ht 5' 10\" (1.778 m)   Wt 196 lb (88.9 kg)   LMP 07/11/2022   BMI 28.12 kg/m²      Physical Exam  Constitutional:       Appearance: Normal appearance. Genitourinary:     Labia:         Right: No rash or tenderness. Left: No rash, tenderness (slight but no concerns no abnormal changes) or lesion. Neurological:      Mental Status: She is alert and oriented to person, place, and time.    Psychiatric:         Mood and Affect: Mood normal.         Behavior: Behavior normal.       Assessment:       Diagnosis Orders   1. Postop check              Plan:      No orders of the defined types were placed in this encounter. No orders of the defined types were placed in this encounter. Return if symptoms worsen or fail to improve.      Elieser Melgoza, DO

## 2022-08-09 ENCOUNTER — HOSPITAL ENCOUNTER (OUTPATIENT)
Age: 22
Discharge: HOME | End: 2022-08-11
Payer: COMMERCIAL

## 2022-08-09 ENCOUNTER — OFFICE VISIT (OUTPATIENT)
Dept: FAMILY MEDICINE CLINIC | Age: 22
End: 2022-08-09
Payer: COMMERCIAL

## 2022-08-09 ENCOUNTER — HOSPITAL ENCOUNTER (OUTPATIENT)
Dept: GENERAL RADIOLOGY | Age: 22
Discharge: HOME | End: 2022-08-11
Payer: COMMERCIAL

## 2022-08-09 VITALS
DIASTOLIC BLOOD PRESSURE: 60 MMHG | HEART RATE: 71 BPM | OXYGEN SATURATION: 99 % | WEIGHT: 196 LBS | SYSTOLIC BLOOD PRESSURE: 112 MMHG | TEMPERATURE: 97.7 F | BODY MASS INDEX: 28.12 KG/M2

## 2022-08-09 DIAGNOSIS — Z72.51 UNPROTECTED SEXUAL INTERCOURSE: ICD-10-CM

## 2022-08-09 DIAGNOSIS — G89.29 CHRONIC MIDLINE LOW BACK PAIN WITHOUT SCIATICA: Primary | ICD-10-CM

## 2022-08-09 DIAGNOSIS — M54.50 CHRONIC MIDLINE LOW BACK PAIN WITHOUT SCIATICA: Primary | ICD-10-CM

## 2022-08-09 DIAGNOSIS — M54.50 CHRONIC MIDLINE LOW BACK PAIN WITHOUT SCIATICA: ICD-10-CM

## 2022-08-09 DIAGNOSIS — G89.29 CHRONIC MIDLINE LOW BACK PAIN WITHOUT SCIATICA: ICD-10-CM

## 2022-08-09 PROCEDURE — 99203 OFFICE O/P NEW LOW 30 MIN: CPT | Performed by: FAMILY MEDICINE

## 2022-08-09 PROCEDURE — 72110 X-RAY EXAM L-2 SPINE 4/>VWS: CPT

## 2022-08-09 PROCEDURE — 72072 X-RAY EXAM THORAC SPINE 3VWS: CPT

## 2022-08-09 SDOH — ECONOMIC STABILITY: FOOD INSECURITY: WITHIN THE PAST 12 MONTHS, YOU WORRIED THAT YOUR FOOD WOULD RUN OUT BEFORE YOU GOT MONEY TO BUY MORE.: NEVER TRUE

## 2022-08-09 SDOH — ECONOMIC STABILITY: FOOD INSECURITY: WITHIN THE PAST 12 MONTHS, THE FOOD YOU BOUGHT JUST DIDN'T LAST AND YOU DIDN'T HAVE MONEY TO GET MORE.: NEVER TRUE

## 2022-08-09 ASSESSMENT — PATIENT HEALTH QUESTIONNAIRE - PHQ9
10. IF YOU CHECKED OFF ANY PROBLEMS, HOW DIFFICULT HAVE THESE PROBLEMS MADE IT FOR YOU TO DO YOUR WORK, TAKE CARE OF THINGS AT HOME, OR GET ALONG WITH OTHER PEOPLE: 0
9. THOUGHTS THAT YOU WOULD BE BETTER OFF DEAD, OR OF HURTING YOURSELF: 0
4. FEELING TIRED OR HAVING LITTLE ENERGY: 0
1. LITTLE INTEREST OR PLEASURE IN DOING THINGS: 0
SUM OF ALL RESPONSES TO PHQ QUESTIONS 1-9: 0
3. TROUBLE FALLING OR STAYING ASLEEP: 0
2. FEELING DOWN, DEPRESSED OR HOPELESS: 0
6. FEELING BAD ABOUT YOURSELF - OR THAT YOU ARE A FAILURE OR HAVE LET YOURSELF OR YOUR FAMILY DOWN: 0
8. MOVING OR SPEAKING SO SLOWLY THAT OTHER PEOPLE COULD HAVE NOTICED. OR THE OPPOSITE, BEING SO FIGETY OR RESTLESS THAT YOU HAVE BEEN MOVING AROUND A LOT MORE THAN USUAL: 0
SUM OF ALL RESPONSES TO PHQ QUESTIONS 1-9: 0
5. POOR APPETITE OR OVEREATING: 0
7. TROUBLE CONCENTRATING ON THINGS, SUCH AS READING THE NEWSPAPER OR WATCHING TELEVISION: 0
SUM OF ALL RESPONSES TO PHQ9 QUESTIONS 1 & 2: 0
SUM OF ALL RESPONSES TO PHQ QUESTIONS 1-9: 0
SUM OF ALL RESPONSES TO PHQ QUESTIONS 1-9: 0

## 2022-08-09 ASSESSMENT — COLUMBIA-SUICIDE SEVERITY RATING SCALE - C-SSRS
6. HAVE YOU EVER DONE ANYTHING, STARTED TO DO ANYTHING, OR PREPARED TO DO ANYTHING TO END YOUR LIFE?: NO
1. WITHIN THE PAST MONTH, HAVE YOU WISHED YOU WERE DEAD OR WISHED YOU COULD GO TO SLEEP AND NOT WAKE UP?: NO
2. HAVE YOU ACTUALLY HAD ANY THOUGHTS OF KILLING YOURSELF?: NO

## 2022-08-09 ASSESSMENT — SOCIAL DETERMINANTS OF HEALTH (SDOH): HOW HARD IS IT FOR YOU TO PAY FOR THE VERY BASICS LIKE FOOD, HOUSING, MEDICAL CARE, AND HEATING?: NOT HARD AT ALL

## 2022-08-09 NOTE — PROGRESS NOTES
Subjective:      Patient ID: Kerri Prasad is a 23 y.o. female who presents today for:     Chief Complaint   Patient presents with    New Patient     Pt states she just switched birth control, states prior to visit today pt was having intercourse, states condom broke, pt would like advise     Back Pain       HPI  Patient is a very pleasant 62-year-old female presents today to establish care. 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 She has been experiencing lower and mid back pain. Limited the latter part of her season earlier this year. She denies any numbness, tingling, bowel or bladder incontinence or weakness. She states the pain is an ache in certain movements exacerbate her pain. She has worked with a  and found some utility to interventions provided. As she states here today, she currently is not experiencing any pain. She states that certain stretches cause a \"pulling sensation\". And additionally patient has concerns about potential pregnancy. She states that she was having intercourse this morning and did not realize that the condom broke. She currently is taking OCPs but took a Plan B this morning prior to coming in the office. She denies any pain or discharge  History reviewed. No pertinent past medical history. Past Surgical History:   Procedure Laterality Date    VULVA SURGERY Left 7/18/2022    EVALUATION UNDER ANESTHESIA WITH DRAINAGE AND EXCISION OF LABIAL CYST LEFT.  performed by Shelby Koch DO at [facility]     Family History   Problem Relation Age of Onset    No Known Problems Father     No Known Problems Mother     Asthma Sister      Social History     Socioeconomic History    Marital status: Single     Spouse name: Not on file    Number of children: Not on file    Years of education: Not on file    Highest education level: Not on file   Occupational History    Not on file   Tobacco Use    Smoking status: Never    Smokeless Negative for seizures, weakness, numbness and headaches. Psychiatric/Behavioral:  Negative for agitation, hallucinations and suicidal ideas. Objective:   /60   Pulse 71   Temp 97.7 °F (36.5 °C)   Wt 196 lb (88.9 kg)   LMP 07/11/2022   SpO2 99%   BMI 28.12 kg/m²     Physical Exam  Vitals reviewed. Constitutional:       Appearance: She is well-developed. HENT:      Head: Normocephalic and atraumatic. Neck:      Vascular: No JVD. Cardiovascular:      Rate and Rhythm: Normal rate and regular rhythm. Heart sounds: Normal heart sounds. Pulmonary:      Effort: Pulmonary effort is normal. No respiratory distress. Breath sounds: Normal breath sounds. Abdominal:      General: Bowel sounds are normal.      Palpations: Abdomen is soft. Musculoskeletal:      Cervical back: Normal range of motion and neck supple. Thoracic back: Tenderness present. Normal range of motion. Lumbar back: No swelling, edema, spasms or tenderness. Normal range of motion. Negative right straight leg raise test and negative left straight leg raise test.        Back:       Comments: Straight leg raise: Negative    Patient has full range of motion, but does complain of a pulling sensation with internal and external rotation of her hips   Lymphadenopathy:      Cervical: No cervical adenopathy. Skin:     General: Skin is warm and dry. Neurological:      Mental Status: She is alert and oriented to person, place, and time. Cranial Nerves: No cranial nerve deficit. Coordination: Coordination normal.      Deep Tendon Reflexes: Reflexes are normal and symmetric. Psychiatric:         Behavior: Behavior normal.         Thought Content: Thought content normal.         Judgment: Judgment normal.       Assessment & Plan:     1. Chronic midline low back pain without sciatica  Given patient's athletic career, will obtain x-rays to rule out any fracture.   Will refer to physical therapy and consider orthopedic consultation as well  - XR THORACIC SPINE (3 VIEWS); Future  - XR LUMBAR SPINE (MIN 4 VIEWS); Future  - Ambulatory referral to Physical Therapy    2. Unprotected sexual intercourse  Patient falls into a low risk category as she is already on OCPs and took a Plan B but advised patient to follow-up with OB/GYN for further advice    Return if symptoms worsen or fail to improve.     Hong Mayo MD

## 2022-08-13 ASSESSMENT — ENCOUNTER SYMPTOMS
COUGH: 0
BLOOD IN STOOL: 0
APNEA: 0
DIARRHEA: 0
BACK PAIN: 1
ABDOMINAL PAIN: 0
SHORTNESS OF BREATH: 0
CHEST TIGHTNESS: 0
CONSTIPATION: 0
NAUSEA: 0
WHEEZING: 0
VOMITING: 0

## 2022-08-16 ENCOUNTER — TELEPHONE (OUTPATIENT)
Dept: FAMILY MEDICINE CLINIC | Age: 22
End: 2022-08-16

## 2022-08-16 NOTE — TELEPHONE ENCOUNTER
----- Message from [employer] sent at 8/16/2022  4:09 PM EDT -----  Subject: Message to Provider    QUESTIONS  Information for Provider? Barry Saira is trying to return a Voicemail   stating to call back about test results. If you could please give her a   call.   ---------------------------------------------------------------------------  --------------  [id number] [employer]  [phone]; OK to leave message on voicemail  ---------------------------------------------------------------------------  --------------  SCRIPT ANSWERS  Relationship to Patient?  Self

## 2022-10-21 ENCOUNTER — OFFICE VISIT (OUTPATIENT)
Dept: SPORTS MEDICINE | Age: 22
End: 2022-10-21
Payer: COMMERCIAL

## 2022-10-21 VITALS
HEIGHT: 70 IN | TEMPERATURE: 97.3 F | BODY MASS INDEX: 27.2 KG/M2 | SYSTOLIC BLOOD PRESSURE: 116 MMHG | DIASTOLIC BLOOD PRESSURE: 72 MMHG | WEIGHT: 190 LBS

## 2022-10-21 DIAGNOSIS — M51.26 HERNIATED INTERVERTEBRAL DISC OF LUMBAR SPINE: Primary | ICD-10-CM

## 2022-10-21 PROCEDURE — 99203 OFFICE O/P NEW LOW 30 MIN: CPT | Performed by: FAMILY MEDICINE

## 2022-10-21 ASSESSMENT — ENCOUNTER SYMPTOMS
DIARRHEA: 0
BACK PAIN: 0
SHORTNESS OF BREATH: 0
CONSTIPATION: 0
NAUSEA: 0

## 2022-10-21 NOTE — PROGRESS NOTES
[facility]) Physicians  Neurosurgery and Pain [facility]  [address] [place] , [address] [facility], [place] [address]: [phone]  F: [phone]      Kathryn Chaney  (2000)    10/21/2022    Subjective:     Kathryn Chaney is 23 y.o. 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 History reviewed. No pertinent past medical history. Past Surgical History:   Procedure Laterality Date    VULVA SURGERY Left 7/18/2022    EVALUATION UNDER ANESTHESIA WITH DRAINAGE AND EXCISION OF LABIAL CYST LEFT.  performed by Cristiana Acevedo DO at [facility] OR     Family History   Problem Relation Age of Onset    No Known Problems Father     No Known Problems Mother     Asthma Sister      Social History     Socioeconomic History    Marital status: Single     Spouse name: Not on file    Number of children: Not on file    Years of education: Not on file    Highest education level: Not on file   Occupational History    Not on file   Tobacco Use    Smoking status: Never    Smokeless tobacco: Never   Vaping Use    Vaping Use: Never used   Substance and Sexual Activity    Alcohol use: Not Currently    Drug use: Never    Sexual activity: Yes     Partners: Male   Other Topics Concern    Not on file   Social History Narrative    Not on file     Social Determinants of Health     Financial Resource Strain: Low Risk     Difficulty of Paying Living Expenses: Not hard at all   Food Insecurity: No Food Insecurity    Worried About Running Out of Food in the Last Year: Never true    Ran Out of Food in the Last Year: Never true   Transportation Needs: Not on file   Physical Activity: Not on file   Stress: Not on file   Social Connections: Not on file   Intimate Partner Violence: Not on file   Housing Stability: Not on file       Current Outpatient Medications on File Prior to Visit   Medication Sig Dispense Refill    Multiple Vitamin (MULTI-VITAMIN DAILY PO) Take by mouth      norethindrone-ethinyl estradiol (NECON 0.5/35, 28,) 0.5-35 MG-MCG per tablet Take 1 tablet by mouth daily      ibuprofen (ADVIL;MOTRIN) 800 MG tablet Take 1 tablet by mouth 3 times daily as needed for Pain (Patient not taking: No sig reported) 90 tablet 0    [DISCONTINUED] ketorolac (TORADOL) 10 MG tablet Take 1 tablet by mouth every 6 hours as needed for Pain (Patient not taking: No sig reported) 20 tablet 0     No current facility-administered medications on file prior to visit. Review of Systems   Constitutional:  Negative for fever. HENT:  Negative for hearing loss. Respiratory:  Negative for shortness of breath. Gastrointestinal:  Negative for constipation, diarrhea and nausea. Genitourinary:  Negative for difficulty urinating. Musculoskeletal:  Negative for back pain and neck pain. Skin:  Negative for rash. Neurological:  Negative for headaches. Hematological:  Does not bruise/bleed easily. Psychiatric/Behavioral:  Negative for sleep disturbance. Objective:     Vitals:  /72   Temp 97.3 °F (36.3 °C)   Ht 5' 10\" (1.778 m)   Wt 190 lb (86.2 kg)   BMI 27.26 kg/m² Pain Score:   2      Physical Exam  Constitutional:       Appearance: Normal appearance. HENT:      Head: Normocephalic. Nose: No rhinorrhea. Mouth/Throat:      Pharynx: Oropharynx is clear.    Eyes:      Pupils: Pupils are equal, round, and reactive to light. Cardiovascular:      Rate and Rhythm: Normal rate. Pulses: Normal pulses. Pulmonary:      Breath sounds: No wheezing. Abdominal:      Palpations: Abdomen is soft. Musculoskeletal:         General: No deformity. Cervical back: No rigidity. Lymphadenopathy:      Cervical: No cervical adenopathy. Skin:     General: Skin is warm and dry. Findings: No rash. Neurological:      Mental Status: She is alert and oriented to person, place, and time. Psychiatric:         Mood and Affect: Mood normal.         Behavior: Behavior normal.       Ortho Exam   Examination of the lumbar spine with the neurovascular muscular status to be intact patient had near full range of motion with some difficulties in flexion had a +1 leg extension test lateralizing to the left positive tension signs lateralizing to the left palpable tenderness around the L4-5 region    Reviewed x-rays of the lumbar spine done on 8/9/2022    Assessment:      Diagnosis Orders   1. Herniated intervertebral disc of lumbar spine  MRI LUMBAR SPINE WO CONTRAST          Plan:   Patient states she was sent for evaluation and treatment she has failed conservative care including medications including ibuprofen as well as extensive physical therapy therefore when to get an MRI to better delineate her pathology to see if there is any surgical needs see her back with MRI results further treatment depend on those results       No orders of the defined types were placed in this encounter. Orders Placed This Encounter   Procedures    MRI LUMBAR SPINE WO CONTRAST     Standing Status:   Future     Standing Expiration Date:   1/19/2023         Follow up:  Return for mri results.     DAYANNA VILLANUEVA DO

## 2022-11-14 ENCOUNTER — OFFICE VISIT (OUTPATIENT)
Dept: OBGYN CLINIC | Age: 22
End: 2022-11-14
Payer: COMMERCIAL

## 2022-11-14 VITALS
HEIGHT: 70 IN | BODY MASS INDEX: 28.92 KG/M2 | DIASTOLIC BLOOD PRESSURE: 68 MMHG | SYSTOLIC BLOOD PRESSURE: 106 MMHG | WEIGHT: 202 LBS

## 2022-11-14 DIAGNOSIS — N93.8 DUB (DYSFUNCTIONAL UTERINE BLEEDING): ICD-10-CM

## 2022-11-14 DIAGNOSIS — Z30.41 ENCOUNTER FOR BIRTH CONTROL PILLS MAINTENANCE: Primary | ICD-10-CM

## 2022-11-14 PROCEDURE — 99213 OFFICE O/P EST LOW 20 MIN: CPT | Performed by: OBSTETRICS & GYNECOLOGY

## 2022-11-14 ASSESSMENT — ENCOUNTER SYMPTOMS
SHORTNESS OF BREATH: 0
APNEA: 0
ABDOMINAL PAIN: 0

## 2022-11-14 NOTE — PROGRESS NOTES
Subjective:      Patient ID:  Cristel Pfeiffer is a 23 y.o. female with chief complaint of:  Chief Complaint   Patient presents with    Follow-up     Pt would like to discuss switching her OCP from a pill to a device. Patient presents for management of ocp. Patient was previously started on ocp for cycle regulation and she is not concerned about compliance. She has experienced improvement however finds occasionally difficult to remember she would like to discuss other options      No past medical history on file. Past Surgical History:   Procedure Laterality Date    VULVA SURGERY Left 7/18/2022    EVALUATION UNDER ANESTHESIA WITH DRAINAGE AND EXCISION OF LABIAL CYST LEFT. performed by Sabrina Lora DO at [address] History   Problem Relation Age of Onset    No Known Problems Father     No Known Problems Mother     Asthma Sister      Current Outpatient Medications on File Prior to Visit   Medication Sig Dispense Refill    Multiple Vitamin (MULTI-VITAMIN DAILY PO) Take by mouth      norethindrone-ethinyl estradiol (NECON 0.5/35, 28,) 0.5-35 MG-MCG per tablet Take 1 tablet by mouth daily      ibuprofen (ADVIL;MOTRIN) 800 MG tablet Take 1 tablet by mouth 3 times daily as needed for Pain (Patient not taking: No sig reported) 90 tablet 0    [DISCONTINUED] ketorolac (TORADOL) 10 MG tablet Take 1 tablet by mouth every 6 hours as needed for Pain (Patient not taking: No sig reported) 20 tablet 0     No current facility-administered medications on file prior to visit. Allergies:  Patient has no known allergies. Review of Systems   Constitutional:  Negative for fatigue and fever. Respiratory:  Negative for apnea and shortness of breath. Cardiovascular:  Negative for chest pain and palpitations. Gastrointestinal:  Negative for abdominal pain. Genitourinary:  Negative for difficulty urinating, dysuria, pelvic pain, vaginal bleeding and vaginal discharge.    Neurological:  Negative for dizziness, weakness and light-headedness. Psychiatric/Behavioral:  Negative for agitation and dysphoric mood. Objective:   /68   Ht 5' 10\" (1.778 m)   Wt 202 lb (91.6 kg)   BMI 28.98 kg/m²      Physical Exam  Constitutional:       Appearance: Normal appearance. She is well-developed. Eyes:      Pupils: Pupils are equal, round, and reactive to light. Cardiovascular:      Rate and Rhythm: Normal rate and regular rhythm. Heart sounds: Normal heart sounds. Pulmonary:      Effort: Pulmonary effort is normal.   Abdominal:      General: Bowel sounds are normal.      Palpations: Abdomen is soft. Neurological:      Mental Status: She is alert and oriented to person, place, and time. Psychiatric:         Mood and Affect: Mood normal.         Behavior: Behavior normal.       Assessment:       Diagnosis Orders   1. Encounter for birth control pills maintenance        2. DUB (dysfunctional uterine bleeding)              Plan:      No orders of the defined types were placed in this encounter. No orders of the defined types were placed in this encounter. Patient is leaning heavily towards nexplanon however would like to think about it risk and benefits of medication discussed in detail    No follow-ups on file.      Jennifer Grant DO

## 2022-11-18 ENCOUNTER — OFFICE VISIT (OUTPATIENT)
Dept: FAMILY MEDICINE CLINIC | Age: 22
End: 2022-11-18
Payer: COMMERCIAL

## 2022-11-18 VITALS
HEIGHT: 70 IN | WEIGHT: 200 LBS | BODY MASS INDEX: 28.63 KG/M2 | SYSTOLIC BLOOD PRESSURE: 100 MMHG | HEART RATE: 72 BPM | OXYGEN SATURATION: 98 % | DIASTOLIC BLOOD PRESSURE: 70 MMHG | TEMPERATURE: 97.5 F

## 2022-11-18 DIAGNOSIS — J06.9 VIRAL URI: Primary | ICD-10-CM

## 2022-11-18 LAB
Lab: NORMAL
PERFORMING INSTRUMENT: NORMAL
QC PASS/FAIL: NORMAL
S PYO AG THROAT QL: NORMAL
SARS-COV-2, POC: NORMAL

## 2022-11-18 PROCEDURE — 99213 OFFICE O/P EST LOW 20 MIN: CPT

## 2022-11-18 PROCEDURE — 87880 STREP A ASSAY W/OPTIC: CPT

## 2022-11-18 PROCEDURE — 87426 SARSCOV CORONAVIRUS AG IA: CPT

## 2022-11-18 RX ORDER — LIDOCAINE HYDROCHLORIDE 20 MG/ML
SOLUTION OROPHARYNGEAL
Qty: 40 ML | Refills: 0 | Status: SHIPPED | OUTPATIENT
Start: 2022-11-18

## 2022-11-18 RX ORDER — IBUPROFEN 600 MG/1
600 TABLET ORAL 3 TIMES DAILY PRN
Qty: 30 TABLET | Refills: 0 | Status: SHIPPED | OUTPATIENT
Start: 2022-11-18

## 2022-11-18 RX ORDER — PSEUDOEPHEDRINE HCL 30 MG
60 TABLET ORAL EVERY 6 HOURS PRN
Qty: 24 TABLET | Refills: 0 | Status: SHIPPED | OUTPATIENT
Start: 2022-11-18 | End: 2022-11-23

## 2022-11-18 ASSESSMENT — ENCOUNTER SYMPTOMS
WHEEZING: 0
SORE THROAT: 1
EYES NEGATIVE: 1
RHINORRHEA: 1
NAUSEA: 0
ABDOMINAL PAIN: 0
SINUS PRESSURE: 0
DIARRHEA: 0
COUGH: 0
VOMITING: 0
SHORTNESS OF BREATH: 0
CHEST TIGHTNESS: 0

## 2022-11-18 NOTE — PATIENT INSTRUCTIONS
Use ibuprofen or Tylenol to treat pain or fever, any fever greater than 101F should be treated. Viscous lidocaine can be used to treat sore throat pain if needed to help you stay well-hydrated. Take 1 teaspoon gargle with that and swish in the back of your throat. Use Sudafed (pseudoephedrine) for the treatment of sinus congestion     Tussin cough syrup (Dextromethorphan) if needed to treat symptom of irritating cough. Expect up to a 7 day course of the illness. Symptoms usually begin to improve between days 3 and 5. It is important to REST and increase water intake. Watch for signs of worsening illness such as feeling light headed, reduced urine output, or shortness of breath when walking.   Return immediately if you experience these symptoms or fevers that cannot be controlled

## 2022-11-18 NOTE — LETTER
[address]  Phone: [phone]  Fax: [phone]    MARGRET Aguilar CNP        November 18, 2022     Patient: Willi Bhatia   YOB: 2000   Date of Visit: 11/18/2022       To Whom it May Concern:    Willi Bhatia was seen in my clinic on 11/18/2022. She may return to school on 11/21/2022. If you have any questions or concerns, please don't hesitate to call.     Sincerely,         MARGRET Aguilar CNP

## 2022-11-18 NOTE — PROGRESS NOTES
[facility] [address] Encounter        ASSESSMENT/PLAN     Mina Genao is a 23 y.o. female who presents with:  Moderate constant sore throat pain and headache worsening over the last 3 days with runny nose symptoms first began 1 week ago last night patient had extreme fatigue. On examination ears are normal bilaterally. She denies tenderness to the maxillary sinuses. Pharynx mildly erythemic tonsils 1+ bilaterally. Neck is supple no masses. Lung sounds are clear throughout. Rapid flu and rapid COVID tests are negative      1. Viral URI      Orders for ibuprofen and viscous lidocaine to treat throat pain. Sudafed for sinus congestion. Advised patient to rest increase fluids over the next couple of days. Expectation is symptoms should self resolve after 5 to 7 days of illness. She is provided note to be off school. PATIENT REFERRED TO:  Return if symptoms worsen or fail to improve. DISCHARGE MEDICATIONS:  New Prescriptions    IBUPROFEN (ADVIL;MOTRIN) 600 MG TABLET    Take 1 tablet by mouth 3 times daily as needed for Pain    LIDOCAINE VISCOUS HCL (XYLOCAINE) 2 % SOLN SOLUTION    Use 5 mL orally every 3 hours as needed for pain, swish in mouth or throat for 2 to 3 minutes and then spit. PSEUDOEPHEDRINE (DECONGESTANT) 30 MG TABLET    Take 2 tablets by mouth every 6 hours as needed for Congestion     Cannot display discharge medications since this is not an admission. MARGRET Braun - AROLDO    CHIEF COMPLAINT       Chief Complaint   Patient presents with    URI     Sore throat, headache, runny nose x1week. Progressed worse in the last 3 days. SUBJECTIVE/REVIEW OF SYSTEMS     Review of Systems   Constitutional:  Positive for fatigue. Negative for chills and fever. HENT:  Positive for congestion, rhinorrhea and sore throat. Negative for ear pain, hearing loss and sinus pressure. Eyes: Negative.     Respiratory:  Negative for cough, chest tightness, shortness of breath and wheezing. Cardiovascular:  Negative for chest pain and palpitations. Gastrointestinal:  Negative for abdominal pain, diarrhea, nausea and vomiting. Endocrine: Negative. Musculoskeletal:  Negative for arthralgias and myalgias. Skin:  Negative for rash. Neurological:  Positive for light-headedness. Negative for dizziness, weakness and headaches. Hematological:  Negative for adenopathy. Psychiatric/Behavioral: Negative. OBJECTIVE/PHYSICAL EXAM     Physical Exam  Vitals reviewed. Constitutional:       Appearance: Normal appearance. She is not ill-appearing or toxic-appearing. HENT:      Head: Normocephalic. Right Ear: Tympanic membrane, ear canal and external ear normal. No middle ear effusion. There is no impacted cerumen. No mastoid tenderness. Tympanic membrane is not perforated, erythematous or bulging. Left Ear: Tympanic membrane, ear canal and external ear normal.  No middle ear effusion. There is no impacted cerumen. No mastoid tenderness. Tympanic membrane is not perforated, erythematous or bulging. Nose: No congestion or rhinorrhea. Mouth/Throat:      Mouth: Mucous membranes are moist.      Pharynx: Oropharynx is clear. Posterior oropharyngeal erythema present. No pharyngeal swelling or oropharyngeal exudate. Tonsils: No tonsillar exudate or tonsillar abscesses. 0 on the right. 1+ on the left. Eyes:      General:         Right eye: No discharge. Left eye: No discharge. Cardiovascular:      Rate and Rhythm: Normal rate and regular rhythm. Pulses: Normal pulses. Heart sounds: Normal heart sounds. No murmur heard. No gallop. Pulmonary:      Effort: Pulmonary effort is normal. No respiratory distress. Breath sounds: Normal breath sounds. No stridor. No wheezing, rhonchi or rales. Chest:      Chest wall: No tenderness. Abdominal:      General: Abdomen is flat.    Musculoskeletal:      Cervical back: No rigidity or tenderness. Lymphadenopathy:      Head:      Right side of head: No submandibular adenopathy. Left side of head: No submandibular adenopathy. Cervical: No cervical adenopathy. Right cervical: No superficial or posterior cervical adenopathy. Left cervical: No superficial or posterior cervical adenopathy. Skin:     General: Skin is warm and dry. Capillary Refill: Capillary refill takes less than 2 seconds. Coloration: Skin is not pale. Neurological:      Mental Status: She is alert and oriented to person, place, and time. Mental status is at baseline. Psychiatric:         Mood and Affect: Mood normal.         Behavior: Behavior normal.       VITALS  BP: 100/70, Temp: 97.5 °F (36.4 °C), Temp Source: Temporal, Heart Rate: 72,  , SpO2: 98 %      PAST MEDICAL HISTORY   History reviewed. No pertinent past medical history. SURGICAL HISTORY     Patient  has a past surgical history that includes Vulva surgery (Left, 7/18/2022). CURRENT MEDICATIONS       Previous Medications    MULTIPLE VITAMIN (MULTI-VITAMIN DAILY PO)    Take by mouth    NORETHINDRONE-ETHINYL ESTRADIOL (NECON 0.5/35, 28,) 0.5-35 MG-MCG PER TABLET    Take 1 tablet by mouth daily     ALLERGIES     Patient is has No Known Allergies. FAMILY HISTORY     Patient'sfamily history includes Asthma in her sister; No Known Problems in her father and mother. HISTORY     Patient  reports that she has never smoked. She has never been exposed to tobacco smoke. She has never used smokeless tobacco. She reports that she does not currently use alcohol. She reports that she does not use drugs. READY CARE COURSE     Orders Placed This Encounter   Procedures    POCT COVID-19, Antigen     Order Specific Question:   Is this test for diagnosis or screening? Answer:   Screening     Order Specific Question:   Symptomatic for COVID-19 as defined by CDC?      Answer:   No     Order Specific Question:   Date of Symptom Onset Answer:   N/A     Order Specific Question:   Hospitalized for COVID-19? Answer:   No     Order Specific Question:   Admitted to ICU for COVID-19? Answer:   No     Order Specific Question:   Employed in healthcare setting? Answer:   Unknown     Order Specific Question:   Resident in a congregate (group) care setting? Answer:   No     Order Specific Question:   Pregnant? Answer:   Unknown     Order Specific Question:   Previously tested for COVID-19? Answer:   Unknown    POCT rapid strep A        Labs:  Results for POC orders placed in visit on 11/18/22   POCT rapid strep A   Result Value Ref Range    Strep A Ag None Detected None Detected     IMAGING:  No orders to display     Scheduled Meds:  Continuous Infusions:  PRN Meds:.

## 2023-01-06 ENCOUNTER — PROCEDURE VISIT (OUTPATIENT)
Dept: OBGYN CLINIC | Age: 23
End: 2023-01-06

## 2023-01-06 VITALS
BODY MASS INDEX: 29.13 KG/M2 | HEART RATE: 78 BPM | WEIGHT: 203 LBS | SYSTOLIC BLOOD PRESSURE: 112 MMHG | DIASTOLIC BLOOD PRESSURE: 64 MMHG

## 2023-01-06 DIAGNOSIS — Z30.017 INSERTION OF NEXPLANON: ICD-10-CM

## 2023-01-06 DIAGNOSIS — N94.6 DYSMENORRHEA: Primary | ICD-10-CM

## 2023-01-06 DIAGNOSIS — Z32.02 URINE PREGNANCY TEST NEGATIVE: ICD-10-CM

## 2023-01-06 PROBLEM — Z86.16 HISTORY OF SEVERE ACUTE RESPIRATORY SYNDROME CORONAVIRUS 2 (SARS-COV-2) DISEASE: Status: ACTIVE | Noted: 2021-03-25

## 2023-01-06 LAB
HCG, URINE, POC: NEGATIVE
Lab: NORMAL
NEGATIVE QC PASS/FAIL: NORMAL
POSITIVE QC PASS/FAIL: NORMAL

## 2023-01-06 NOTE — PROGRESS NOTES
Nexplanon Insertion Procedure Note    There were no vitals taken for this visit. Indications:  Dysmenorrhea     Pre-operative Diagnosis:  Same     Post-operative Diagnosis: Same    Procedure Details:   Procedure risks, complications, activity restrictions following procedure, possible adverse effects, and warning/danger signs to report immediately were all discussed in detail and patient participation/questions were encouraged. Following this discussion, informed consent was obtained. Urine pregnancy test was performed and the result was Negative. A time out was performed with patient participation. The patient was then positioned comfortably on the procedure table. From the left humerus epicondyl, a distance of 8cm was measured and marked. A sterile prep and drape was completed and 1% lidocaine was administered for local anesthetia. Using the applicator, the implant was inserted while utilizing a tenting motion. The implant was then palpated by the patient. A sterile dressing with a light pressure wrap was applied. Procedure was tolerated well. Nexplanon Information:  LOT number: [id number]  Expiration Date: 87631-500-27  NDC:  11/3/2024    Condition:  Stable    Complications:  None    Plan:  The patient was advised to call for any fever or for prolonged or severe pain or bleeding. She was advised to use OTC ibuprofen or Tylenol as needed for mild to moderate pain.

## 2023-03-16 ENCOUNTER — OFFICE VISIT (OUTPATIENT)
Dept: OBGYN CLINIC | Age: 23
End: 2023-03-16
Payer: COMMERCIAL

## 2023-03-16 VITALS
SYSTOLIC BLOOD PRESSURE: 112 MMHG | HEIGHT: 70 IN | WEIGHT: 210 LBS | BODY MASS INDEX: 30.06 KG/M2 | DIASTOLIC BLOOD PRESSURE: 72 MMHG

## 2023-03-16 DIAGNOSIS — Z97.5 NEXPLANON IN PLACE: ICD-10-CM

## 2023-03-16 DIAGNOSIS — N93.8 DUB (DYSFUNCTIONAL UTERINE BLEEDING): Primary | ICD-10-CM

## 2023-03-16 PROCEDURE — 99212 OFFICE O/P EST SF 10 MIN: CPT | Performed by: OBSTETRICS & GYNECOLOGY

## 2023-03-16 ASSESSMENT — ENCOUNTER SYMPTOMS
APNEA: 0
ABDOMINAL PAIN: 0
SHORTNESS OF BREATH: 0

## 2023-03-17 NOTE — PROGRESS NOTES
Subjective:      Patient ID:  Jackelin Jacobs is a 23 y.o. female with chief complaint of:  Chief Complaint   Patient presents with    Follow-up     Pt here today for follow up on nexplanon        Patient presents after starting nexplanon. She admits to irregular bleeding this first month however no bleeding now and no complaints. No headaches no blurred vision  no chest pain or shortness of breath      No past medical history on file.  Past Surgical History:   Procedure Laterality Date    VULVA SURGERY Left 7/18/2022    EVALUATION UNDER ANESTHESIA WITH DRAINAGE AND EXCISION OF LABIAL CYST LEFT. performed by Devan Gaines DO at [facility]     Family History   Problem Relation Age of Onset    No Known Problems Father     No Known Problems Mother     Asthma Sister      Current Outpatient Medications on File Prior to Visit   Medication Sig Dispense Refill    ibuprofen (ADVIL;MOTRIN) 600 MG tablet Take 1 tablet by mouth 3 times daily as needed for Pain 30 tablet 0    Multiple Vitamin (MULTI-VITAMIN DAILY PO) Take by mouth      [DISCONTINUED] ketorolac (TORADOL) 10 MG tablet Take 1 tablet by mouth every 6 hours as needed for Pain (Patient not taking: No sig reported) 20 tablet 0     Current Facility-Administered Medications on File Prior to Visit   Medication Dose Route Frequency Provider Last Rate Last Admin    etonogestrel (NEXPLANON) implant 68 mg  68 mg Subdermal Once Rylee MARGRET Sheppard CNM   68 mg at 01/06/23 1128     Allergies:  Patient has no known allergies.    Review of Systems   Constitutional:  Negative for fatigue and fever.   Respiratory:  Negative for apnea and shortness of breath.    Cardiovascular:  Negative for chest pain and palpitations.   Gastrointestinal:  Negative for abdominal pain.   Genitourinary:  Negative for difficulty urinating, dysuria, pelvic pain, vaginal bleeding and vaginal discharge.   Neurological:  Negative for dizziness, weakness and light-headedness.     Objective:   /72   Ht 5' 10\" (1.778 m)   Wt 210 lb (95.3 kg)   BMI 30.13 kg/m²      Physical Exam  Constitutional:       Appearance: She is well-developed. Eyes:      Pupils: Pupils are equal, round, and reactive to light. Cardiovascular:      Rate and Rhythm: Normal rate and regular rhythm. Heart sounds: Normal heart sounds. Pulmonary:      Effort: Pulmonary effort is normal.   Abdominal:      General: Bowel sounds are normal.      Palpations: Abdomen is soft. Neurological:      Mental Status: She is alert and oriented to person, place, and time. Psychiatric:         Mood and Affect: Mood normal.         Behavior: Behavior normal.       Assessment:       Diagnosis Orders   1. DUB (dysfunctional uterine bleeding)        2. Nexplanon in place              Plan:      No orders of the defined types were placed in this encounter. No orders of the defined types were placed in this encounter. No follow-ups on file.      Windy Ruiz DO

## (undated) DEVICE — PACK,BASIC: Brand: MEDLINE

## (undated) DEVICE — PAD,ABDOMINAL,8"X10",ST,LF: Brand: MEDLINE

## (undated) DEVICE — SYRINGE MED 10ML LUERLOCK TIP W/O SFTY DISP

## (undated) DEVICE — LITHOTOMY DRAPE WITH FLUID CONTROL POUCH: Brand: CONVERTORS

## (undated) DEVICE — TOWEL,OR,DSP,ST,BLUE,STD,4/PK,20PK/CS: Brand: MEDLINE

## (undated) DEVICE — LINER PAD CONTOUR SUPER PEACH 7X14IN

## (undated) DEVICE — INTENDED FOR TISSUE SEPARATION, AND OTHER PROCEDURES THAT REQUIRE A SHARP SURGICAL BLADE TO PUNCTURE OR CUT.: Brand: BARD-PARKER ® CARBON RIB-BACK BLADES

## (undated) DEVICE — GLOVE SURG L12IN SZ 65FNGR THK94MIL TRNSLUC YEL LTX

## (undated) DEVICE — NEPTUNE E-SEP SMOKE EVACUATION PENCIL, COATED, 70MM BLADE, PUSH BUTTON SWITCH: Brand: NEPTUNE E-SEP

## (undated) DEVICE — COVER LT HNDL BLU PLAS

## (undated) DEVICE — SUTURE VCRL SZ 4-0 L27IN ABSRB UD L26MM SH 1/2 CIR J415H

## (undated) DEVICE — CATHETER,URETHRAL,VINYL,FEMALE,6",14FR: Brand: MEDLINE

## (undated) DEVICE — LABEL MED MINI W/ MARKER

## (undated) DEVICE — NEEDLE HYPO 25GA L1.5IN BLU POLYPR HUB S STL REG BVL STR

## (undated) DEVICE — GOWN,AURORA,NONREINFORCED,LARGE: Brand: MEDLINE

## (undated) DEVICE — MARKER SURG SKIN GENTIAN VLT REG TIP W/ 6IN RUL

## (undated) DEVICE — ELECTRODE PT RET AD L9FT HI MOIST COND ADH HYDRGEL CORDED

## (undated) DEVICE — SKIN PREP TRAY 4 COMPARTM TRAY: Brand: MEDLINE INDUSTRIES, INC.